# Patient Record
Sex: FEMALE | Race: WHITE | Employment: FULL TIME | URBAN - METROPOLITAN AREA
[De-identification: names, ages, dates, MRNs, and addresses within clinical notes are randomized per-mention and may not be internally consistent; named-entity substitution may affect disease eponyms.]

---

## 2024-01-02 ENCOUNTER — APPOINTMENT (OUTPATIENT)
Dept: GENERAL RADIOLOGY | Age: 59
End: 2024-01-02

## 2024-01-02 ENCOUNTER — HOSPITAL ENCOUNTER (EMERGENCY)
Age: 59
Discharge: HOME | End: 2024-01-02
Attending: EMERGENCY MEDICINE

## 2024-01-02 VITALS
HEIGHT: 67 IN | HEART RATE: 59 BPM | SYSTOLIC BLOOD PRESSURE: 129 MMHG | WEIGHT: 170 LBS | RESPIRATION RATE: 16 BRPM | DIASTOLIC BLOOD PRESSURE: 71 MMHG | OXYGEN SATURATION: 96 % | BODY MASS INDEX: 26.68 KG/M2

## 2024-01-02 DIAGNOSIS — S93.402A SPRAIN OF LEFT ANKLE, UNSPECIFIED LIGAMENT, INITIAL ENCOUNTER: Primary | ICD-10-CM

## 2024-01-02 PROCEDURE — 73630 X-RAY EXAM OF FOOT: CPT

## 2024-01-02 PROCEDURE — 99284 EMERGENCY DEPT VISIT MOD MDM: CPT

## 2024-01-02 PROCEDURE — 6360000002 HC RX W HCPCS: Performed by: NURSE PRACTITIONER

## 2024-01-02 PROCEDURE — 73610 X-RAY EXAM OF ANKLE: CPT

## 2024-01-02 PROCEDURE — 96372 THER/PROPH/DIAG INJ SC/IM: CPT

## 2024-01-02 RX ORDER — ACETAMINOPHEN AND CODEINE PHOSPHATE 300; 30 MG/1; MG/1
1 TABLET ORAL EVERY 6 HOURS PRN
Qty: 10 TABLET | Refills: 0 | Status: SHIPPED | OUTPATIENT
Start: 2024-01-02 | End: 2024-01-05

## 2024-01-02 RX ORDER — KETOROLAC TROMETHAMINE 30 MG/ML
30 INJECTION, SOLUTION INTRAMUSCULAR; INTRAVENOUS ONCE
Status: COMPLETED | OUTPATIENT
Start: 2024-01-02 | End: 2024-01-02

## 2024-01-02 RX ADMIN — KETOROLAC TROMETHAMINE 30 MG: 30 INJECTION, SOLUTION INTRAMUSCULAR; INTRAVENOUS at 14:11

## 2024-01-02 ASSESSMENT — PAIN - FUNCTIONAL ASSESSMENT: PAIN_FUNCTIONAL_ASSESSMENT: 0-10

## 2024-01-02 ASSESSMENT — PAIN SCALES - GENERAL
PAINLEVEL_OUTOF10: 7
PAINLEVEL_OUTOF10: 7

## 2024-01-02 ASSESSMENT — PAIN DESCRIPTION - ORIENTATION: ORIENTATION: LEFT

## 2024-01-02 ASSESSMENT — ENCOUNTER SYMPTOMS: COLOR CHANGE: 1

## 2024-01-02 ASSESSMENT — PAIN DESCRIPTION - LOCATION: LOCATION: ANKLE

## 2024-01-02 NOTE — DISCHARGE INSTRUCTIONS
Wear the orthopedic boot for the next week.  Keep your ankle elevated while at rest.  Take medication as prescribed.  Tylenol with codeine may cause drowsiness.  Follow-up with the podiatrist in 1 week.  Return to emergency department for worsening or new symptoms.

## 2024-01-02 NOTE — ED PROVIDER NOTES
eMERGENCY dEPARTMENT eNCOUnter   Independent Attestation     Pt Name: Reta Flores  MRN: [id number]  Birthdate 1965  Date of evaluation: 1/2/24     Reta Flores is a 58 y.o. female with CC: Ankle Pain (TRIPPED AND INJURE LEFT ANKLE)        This visit was performed by both a physician and an APC. I performed all aspects of the MDM as documented.      Erica B Goldberger, MD  Attending Emergency Physician           Goldberger, Erica B, MD  01/02/24 1928    
interpreted by the emergency physician with the below findings:    Interpretation per the Radiologist below, if available at the time of this note:    XR FOOT LEFT (MIN 3 VIEWS)    Result Date: 1/2/2024  EXAMINATION: THREE XRAY VIEWS OF THE LEFT FOOT 1/2/2024 2:10 pm COMPARISON: None. HISTORY: ORDERING SYSTEM PROVIDED HISTORY: INJURY TECHNOLOGIST PROVIDED HISTORY: INJURY Reason for Exam: Pt c/o pain to left foot/ankle, injury FINDINGS: There is no evidence of acute fracture.  There is normal alignment of the tarsometatarsal joints.  No acute joint abnormality.  No focal osseous lesion. No focal soft tissue abnormality.  Incidental note is made of spur formation at the insertion of the plantar fascia into the calcaneus peer     No acute osseous abnormality.     XR ANKLE LEFT (MIN 3 VIEWS)    Result Date: 1/2/2024  EXAMINATION: THREE XRAY VIEWS OF THE LEFT ANKLE 1/2/2024 2:03 pm COMPARISON: None. HISTORY: ORDERING SYSTEM PROVIDED HISTORY: INJURY TECHNOLOGIST PROVIDED HISTORY: INJURY Reason for Exam: Pt c/o pain to left foot/ankle, injury FINDINGS: No evidence of acute fracture or dislocation.  Normal alignment of the ankle mortise.  No focal osseous lesion.  No evidence of joint effusion. No focal soft tissue abnormality.  Incidental note is made of spur formation at the insertion of the plantar fascia into the calcaneus.     No acute abnormality of the ankle.         LABS:  Labs Reviewed - No data to display    All other labs were within normal range or not returned as of this dictation.    EMERGENCY DEPARTMENT COURSE and DIFFERENTIAL DIAGNOSIS/MDM:   Vitals:    Vitals:    01/02/24 1338 01/02/24 1416   BP: 129/71    Pulse: 59    Resp: 16    SpO2: (!) 2% 96%   Weight: 77.1 kg (170 lb)    Height: 1.702 m (5' 7\")          MEDICATIONS GIVEN IN THE ED:  Medications   ketorolac (TORADOL) injection 30 mg (30 mg IntraMUSCular Given 1/2/24 1411)       CLINICAL DECISION MAKING:  The patient presented alert with a nontoxic

## 2024-01-02 NOTE — ED NOTES
Pt to er with c/o left ankle pain. Pt states she tripped and fell. Pt has swelling noted to left ankle. No obvious deformity noted. Pt a&ox3. Skin warm and dry. Respirations even and non-labored.

## 2024-03-23 ENCOUNTER — HOSPITAL ENCOUNTER (EMERGENCY)
Age: 59
Discharge: HOME | End: 2024-03-24
Attending: STUDENT IN AN ORGANIZED HEALTH CARE EDUCATION/TRAINING PROGRAM
Payer: COMMERCIAL

## 2024-03-23 VITALS
BODY MASS INDEX: 26.68 KG/M2 | DIASTOLIC BLOOD PRESSURE: 89 MMHG | OXYGEN SATURATION: 97 % | TEMPERATURE: 97.5 F | SYSTOLIC BLOOD PRESSURE: 149 MMHG | HEART RATE: 61 BPM | RESPIRATION RATE: 14 BRPM | HEIGHT: 67 IN | WEIGHT: 170 LBS

## 2024-03-23 DIAGNOSIS — K29.00 ACUTE GASTRITIS, PRESENCE OF BLEEDING UNSPECIFIED, UNSPECIFIED GASTRITIS TYPE: Primary | ICD-10-CM

## 2024-03-23 DIAGNOSIS — Z98.84 HISTORY OF ROUX-EN-Y GASTRIC BYPASS: ICD-10-CM

## 2024-03-23 PROCEDURE — 99285 EMERGENCY DEPT VISIT HI MDM: CPT

## 2024-03-23 PROCEDURE — 96375 TX/PRO/DX INJ NEW DRUG ADDON: CPT

## 2024-03-23 PROCEDURE — 96374 THER/PROPH/DIAG INJ IV PUSH: CPT

## 2024-03-23 PROCEDURE — 96376 TX/PRO/DX INJ SAME DRUG ADON: CPT

## 2024-03-23 RX ORDER — FLUTICASONE PROPIONATE 50 MCG
SPRAY, SUSPENSION (ML) NASAL
COMMUNITY
Start: 2016-10-30

## 2024-03-23 RX ORDER — LURASIDONE HYDROCHLORIDE 40 MG/1
TABLET, FILM COATED ORAL
COMMUNITY
Start: 2021-12-02

## 2024-03-23 RX ORDER — CLONAZEPAM 1 MG/1
1 TABLET ORAL DAILY
COMMUNITY
Start: 2024-03-04

## 2024-03-23 RX ORDER — ATORVASTATIN CALCIUM 10 MG/1
TABLET, FILM COATED ORAL
COMMUNITY

## 2024-03-23 RX ORDER — EPINEPHRINE 0.3 MG/.3ML
INJECTION SUBCUTANEOUS
COMMUNITY
Start: 2024-02-16

## 2024-03-23 RX ORDER — LAMOTRIGINE 100 MG/1
100 TABLET ORAL DAILY
COMMUNITY

## 2024-03-23 RX ORDER — DERMATOPHAGOIDES PTERONYSSINUS AND DERMATOPHAGOIDES FARINAE 6; 6 [ARB'U]/1; [ARB'U]/1
TABLET SUBLINGUAL
COMMUNITY
Start: 2024-03-11

## 2024-03-23 ASSESSMENT — PAIN - FUNCTIONAL ASSESSMENT: PAIN_FUNCTIONAL_ASSESSMENT: 0-10

## 2024-03-23 ASSESSMENT — PAIN SCALES - GENERAL: PAINLEVEL_OUTOF10: 9

## 2024-03-24 ENCOUNTER — APPOINTMENT (OUTPATIENT)
Dept: CT IMAGING | Age: 59
End: 2024-03-24
Payer: COMMERCIAL

## 2024-03-24 LAB
ALBUMIN SERPL-MCNC: 4.2 G/DL (ref 3.5–5.2)
ALP SERPL-CCNC: 80 U/L (ref 35–104)
ALT SERPL-CCNC: 31 U/L (ref 5–33)
ANION GAP SERPL CALCULATED.3IONS-SCNC: 9 MMOL/L (ref 9–17)
AST SERPL-CCNC: 30 U/L
BASOPHILS # BLD: 0.04 K/UL (ref 0–0.2)
BASOPHILS NFR BLD: 1 % (ref 0–2)
BILIRUB SERPL-MCNC: 0.4 MG/DL (ref 0.3–1.2)
BILIRUB UR QL STRIP: NEGATIVE
BUN SERPL-MCNC: 18 MG/DL (ref 6–20)
BUN/CREAT SERPL: 23 (ref 9–20)
CALCIUM SERPL-MCNC: 9.6 MG/DL (ref 8.6–10.4)
CHLORIDE SERPL-SCNC: 104 MMOL/L (ref 98–107)
CLARITY UR: CLEAR
CO2 SERPL-SCNC: 29 MMOL/L (ref 20–31)
COLOR UR: YELLOW
CREAT SERPL-MCNC: 0.8 MG/DL (ref 0.5–0.9)
D DIMER PPP FEU-MCNC: 0.4 UG/ML FEU (ref 0–0.59)
EOSINOPHIL # BLD: 0.19 K/UL (ref 0–0.44)
EOSINOPHILS RELATIVE PERCENT: 4 % (ref 1–4)
ERYTHROCYTE [DISTWIDTH] IN BLOOD BY AUTOMATED COUNT: 12.2 % (ref 11.8–14.4)
GFR SERPL CREATININE-BSD FRML MDRD: >60 ML/MIN/1.73M2
GLUCOSE SERPL-MCNC: 98 MG/DL (ref 70–99)
GLUCOSE UR STRIP-MCNC: NEGATIVE MG/DL
HCT VFR BLD AUTO: 35.8 % (ref 36.3–47.1)
HGB BLD-MCNC: 12 G/DL (ref 11.9–15.1)
HGB UR QL STRIP.AUTO: NEGATIVE
IMM GRANULOCYTES # BLD AUTO: 0.01 K/UL (ref 0–0.3)
IMM GRANULOCYTES NFR BLD: 0 %
KETONES UR STRIP-MCNC: NEGATIVE MG/DL
LACTATE BLDV-SCNC: 1.6 MMOL/L (ref 0.5–1.9)
LEUKOCYTE ESTERASE UR QL STRIP: NEGATIVE
LIPASE SERPL-CCNC: 72 U/L (ref 13–60)
LYMPHOCYTES NFR BLD: 1.68 K/UL (ref 1.1–3.7)
LYMPHOCYTES RELATIVE PERCENT: 37 % (ref 24–43)
MCH RBC QN AUTO: 31.3 PG (ref 25.2–33.5)
MCHC RBC AUTO-ENTMCNC: 33.5 G/DL (ref 28.4–34.8)
MCV RBC AUTO: 93.5 FL (ref 82.6–102.9)
MONOCYTES NFR BLD: 0.28 K/UL (ref 0.1–1.2)
MONOCYTES NFR BLD: 6 % (ref 3–12)
NEUTROPHILS NFR BLD: 52 % (ref 36–65)
NEUTS SEG NFR BLD: 2.4 K/UL (ref 1.5–8.1)
NITRITE UR QL STRIP: NEGATIVE
NRBC BLD-RTO: 0 PER 100 WBC
PH UR STRIP: 6 [PH] (ref 5–8)
PLATELET # BLD AUTO: 158 K/UL (ref 138–453)
PMV BLD AUTO: 9.4 FL (ref 8.1–13.5)
POTASSIUM SERPL-SCNC: 3.7 MMOL/L (ref 3.7–5.3)
PROT SERPL-MCNC: 6.1 G/DL (ref 6.4–8.3)
PROT UR STRIP-MCNC: NEGATIVE MG/DL
RBC # BLD AUTO: 3.83 M/UL (ref 3.95–5.11)
SODIUM SERPL-SCNC: 142 MMOL/L (ref 135–144)
SP GR UR STRIP: 1.01 (ref 1–1.03)
TROPONIN I SERPL HS-MCNC: <6 NG/L (ref 0–14)
UROBILINOGEN UR STRIP-ACNC: NORMAL EU/DL (ref 0–1)
WBC OTHER # BLD: 4.6 K/UL (ref 3.5–11.3)

## 2024-03-24 PROCEDURE — 85025 COMPLETE CBC W/AUTO DIFF WBC: CPT

## 2024-03-24 PROCEDURE — 96376 TX/PRO/DX INJ SAME DRUG ADON: CPT

## 2024-03-24 PROCEDURE — 6360000002 HC RX W HCPCS: Performed by: STUDENT IN AN ORGANIZED HEALTH CARE EDUCATION/TRAINING PROGRAM

## 2024-03-24 PROCEDURE — 6370000000 HC RX 637 (ALT 250 FOR IP): Performed by: STUDENT IN AN ORGANIZED HEALTH CARE EDUCATION/TRAINING PROGRAM

## 2024-03-24 PROCEDURE — 96375 TX/PRO/DX INJ NEW DRUG ADDON: CPT

## 2024-03-24 PROCEDURE — 81003 URINALYSIS AUTO W/O SCOPE: CPT

## 2024-03-24 PROCEDURE — 85379 FIBRIN DEGRADATION QUANT: CPT

## 2024-03-24 PROCEDURE — 36415 COLL VENOUS BLD VENIPUNCTURE: CPT

## 2024-03-24 PROCEDURE — 83605 ASSAY OF LACTIC ACID: CPT

## 2024-03-24 PROCEDURE — 80053 COMPREHEN METABOLIC PANEL: CPT

## 2024-03-24 PROCEDURE — 84484 ASSAY OF TROPONIN QUANT: CPT

## 2024-03-24 PROCEDURE — 83690 ASSAY OF LIPASE: CPT

## 2024-03-24 PROCEDURE — 96374 THER/PROPH/DIAG INJ IV PUSH: CPT

## 2024-03-24 PROCEDURE — 6360000004 HC RX CONTRAST MEDICATION: Performed by: STUDENT IN AN ORGANIZED HEALTH CARE EDUCATION/TRAINING PROGRAM

## 2024-03-24 PROCEDURE — 2580000003 HC RX 258: Performed by: STUDENT IN AN ORGANIZED HEALTH CARE EDUCATION/TRAINING PROGRAM

## 2024-03-24 PROCEDURE — 74177 CT ABD & PELVIS W/CONTRAST: CPT

## 2024-03-24 RX ORDER — KETOROLAC TROMETHAMINE 30 MG/ML
30 INJECTION, SOLUTION INTRAMUSCULAR; INTRAVENOUS ONCE
Status: COMPLETED | OUTPATIENT
Start: 2024-03-24 | End: 2024-03-24

## 2024-03-24 RX ORDER — 0.9 % SODIUM CHLORIDE 0.9 %
80 INTRAVENOUS SOLUTION INTRAVENOUS ONCE
Status: DISCONTINUED | OUTPATIENT
Start: 2024-03-24 | End: 2024-03-24 | Stop reason: HOSPADM

## 2024-03-24 RX ORDER — MAGNESIUM HYDROXIDE/ALUMINUM HYDROXICE/SIMETHICONE 120; 1200; 1200 MG/30ML; MG/30ML; MG/30ML
30 SUSPENSION ORAL ONCE
Status: COMPLETED | OUTPATIENT
Start: 2024-03-24 | End: 2024-03-24

## 2024-03-24 RX ORDER — PANTOPRAZOLE SODIUM 20 MG/1
40 TABLET, DELAYED RELEASE ORAL DAILY
Qty: 60 TABLET | Refills: 0 | Status: SHIPPED | OUTPATIENT
Start: 2024-03-24

## 2024-03-24 RX ORDER — ONDANSETRON 2 MG/ML
4 INJECTION INTRAMUSCULAR; INTRAVENOUS ONCE
Status: COMPLETED | OUTPATIENT
Start: 2024-03-24 | End: 2024-03-24

## 2024-03-24 RX ORDER — SODIUM CHLORIDE 0.9 % (FLUSH) 0.9 %
10 SYRINGE (ML) INJECTION ONCE
Status: COMPLETED | OUTPATIENT
Start: 2024-03-24 | End: 2024-03-24

## 2024-03-24 RX ORDER — MORPHINE SULFATE 4 MG/ML
4 INJECTION, SOLUTION INTRAMUSCULAR; INTRAVENOUS ONCE
Status: COMPLETED | OUTPATIENT
Start: 2024-03-24 | End: 2024-03-24

## 2024-03-24 RX ORDER — PANTOPRAZOLE SODIUM 40 MG/1
40 TABLET, DELAYED RELEASE ORAL ONCE
Status: COMPLETED | OUTPATIENT
Start: 2024-03-24 | End: 2024-03-24

## 2024-03-24 RX ADMIN — ALUMINUM HYDROXIDE, MAGNESIUM HYDROXIDE, AND SIMETHICONE 30 ML: 1200; 120; 1200 SUSPENSION ORAL at 03:59

## 2024-03-24 RX ADMIN — ONDANSETRON 4 MG: 2 INJECTION INTRAMUSCULAR; INTRAVENOUS at 00:57

## 2024-03-24 RX ADMIN — PANTOPRAZOLE SODIUM 40 MG: 40 TABLET, DELAYED RELEASE ORAL at 03:59

## 2024-03-24 RX ADMIN — SODIUM CHLORIDE, PRESERVATIVE FREE 10 ML: 5 INJECTION INTRAVENOUS at 01:53

## 2024-03-24 RX ADMIN — KETOROLAC TROMETHAMINE 30 MG: 30 INJECTION, SOLUTION INTRAMUSCULAR at 02:03

## 2024-03-24 RX ADMIN — HYDROMORPHONE HYDROCHLORIDE 0.5 MG: 1 INJECTION, SOLUTION INTRAMUSCULAR; INTRAVENOUS; SUBCUTANEOUS at 03:27

## 2024-03-24 RX ADMIN — Medication 80 ML: at 01:53

## 2024-03-24 RX ADMIN — IOPAMIDOL 75 ML: 755 INJECTION, SOLUTION INTRAVENOUS at 01:43

## 2024-03-24 RX ADMIN — MORPHINE SULFATE 4 MG: 4 INJECTION, SOLUTION INTRAMUSCULAR; INTRAVENOUS at 00:57

## 2024-03-24 RX ADMIN — ONDANSETRON 4 MG: 2 INJECTION INTRAMUSCULAR; INTRAVENOUS at 03:27

## 2024-03-24 ASSESSMENT — PAIN SCALES - GENERAL
PAINLEVEL_OUTOF10: 9
PAINLEVEL_OUTOF10: 8
PAINLEVEL_OUTOF10: 7

## 2024-03-24 ASSESSMENT — PAIN DESCRIPTION - LOCATION
LOCATION: ABDOMEN;BACK
LOCATION: ABDOMEN;BACK

## 2024-03-24 ASSESSMENT — PAIN DESCRIPTION - ORIENTATION
ORIENTATION: MID;RIGHT
ORIENTATION: MID

## 2024-03-24 ASSESSMENT — PAIN DESCRIPTION - DESCRIPTORS
DESCRIPTORS: SQUEEZING;STABBING;ACHING
DESCRIPTORS: STABBING

## 2024-03-24 NOTE — DISCHARGE INSTRUCTIONS
Call today or tomorrow to follow up with Dr. Erickson for endoscopy.    Take your medication as indicated.  Do not drink any alcohol.  Avoid spicy foods and dairy products.  Avoid drinking carbonated beverages (especially any of the dark beverages like coke or pepsi).    Return to the Emergency Department for worsening of symptoms, excessive nausea or vomiting, throwing up blood, black stools, any other care or concern.

## 2024-03-24 NOTE — ED NOTES
Pt arrived to ED with complaints of ABD pain that radiates to her back. She states it has been on going for a while (approx 2months) but has woken her up from her sleep the past few nights. A&O x4. RR even and unlabored.

## 2024-03-25 NOTE — ED PROVIDER NOTES
[facility] EMERGENCY DEPARTMENT ENCOUNTER      Pt Name: Reta Flores  MRN: [id number]  Birthdate 1965  Date of evaluation: 3/25/24    CHIEF COMPLAINT       Chief Complaint   Patient presents with    Abdominal Pain     Middle, upper abdomen and radiates to her back       HISTORY OF PRESENT ILLNESS   Reta Flores is a 59 y.o. female who presents with nausea, vomiting, abdominal pain.  Symptoms have been constant and worsening ongoing for the past 2 months.  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  Pain has been intermittent.  Worsening.    PASTMEDICAL HISTORY     Past Medical History:   Diagnosis Date    Diabetes mellitus (HCC)      Past Problem List  There is no problem list on file for this patient.      SURGICAL HISTORY       Past Surgical History:   Procedure Laterality Date    BREAST SURGERY      GASTRIC BYPASS SURGERY      OVARIAN CYST REMOVAL      RHINOPLASTY      TYMPANOPLASTY         CURRENT MEDICATIONS       Discharge Medication List as of 3/24/2024  3:58 AM        CONTINUE these medications which have NOT CHANGED    Details   LATUDA 40 MG TABS tablet DAWHistorical Med      house dust mite allergen extract (ODACTRA) 12 SQ-HDM SUBL sublingual tablet PLEASE SEE ATTACHED FOR DETAILED DIRECTIONSHistorical Med      clonazePAM (KLONOPIN) 1 MG tablet Take 1 tablet by mouth daily. Max Daily Amount: 1 mgHistorical Med      EPINEPHrine (EPIPEN) 0.3 MG/0.3ML SOAJ injection AS DIRECTED INJECTION ONCE FOR SEVERE ALLERGIC REACTION 30 DAYSHistorical Med      fluticasone (FLONASE) 50 MCG/ACT nasal spray Historical Med      atorvastatin (LIPITOR) 10 MG tablet TAKE 1 TABLET ONCE DAILY ORALLY ONCE A DAY 90 DAYSHistorical Med      lamoTRIgine (LAMICTAL) 100 MG tablet Take 1 tablet by mouth dailyHistorical Med      PARoxetine (PAXIL) 40 MG tablet Take 40 mg by mouth every morning.      ALPRAZolam (XANAX) 0.25 MG tablet Take 0.5 mg by mouth nightly as needed for Sleep.      ondansetron (ZOFRAN ODT)

## 2024-04-02 RX ORDER — PANTOPRAZOLE SODIUM 20 MG/1
40 TABLET, DELAYED RELEASE ORAL DAILY
Qty: 60 TABLET | Refills: 0 | OUTPATIENT
Start: 2024-04-02

## 2024-04-04 ENCOUNTER — INITIAL CONSULT (OUTPATIENT)
Dept: BARIATRICS/WEIGHT MGMT | Age: 59
End: 2024-04-04
Payer: COMMERCIAL

## 2024-04-04 VITALS
DIASTOLIC BLOOD PRESSURE: 70 MMHG | HEART RATE: 60 BPM | SYSTOLIC BLOOD PRESSURE: 120 MMHG | WEIGHT: 176 LBS | HEIGHT: 66 IN | BODY MASS INDEX: 28.28 KG/M2

## 2024-04-04 DIAGNOSIS — K90.89 OTHER SPECIFIED INTESTINAL MALABSORPTION: ICD-10-CM

## 2024-04-04 DIAGNOSIS — Z98.84 S/P GASTRIC BYPASS: ICD-10-CM

## 2024-04-04 DIAGNOSIS — K28.9 GASTROJEJUNAL ULCER: Primary | ICD-10-CM

## 2024-04-04 PROCEDURE — 99204 OFFICE O/P NEW MOD 45 MIN: CPT | Performed by: SURGERY

## 2024-04-04 RX ORDER — SUCRALFATE 1 G/1
1 TABLET ORAL 4 TIMES DAILY
COMMUNITY
Start: 2024-03-25 | End: 2024-04-12 | Stop reason: ALTCHOICE

## 2024-04-04 RX ORDER — AMMONIUM LACTATE 12 G/100G
1 LOTION TOPICAL PRN
COMMUNITY
Start: 2024-02-26

## 2024-04-04 RX ORDER — PANTOPRAZOLE SODIUM 40 MG/1
40 TABLET, DELAYED RELEASE ORAL 2 TIMES DAILY
Qty: 30 TABLET | Refills: 3 | Status: SHIPPED | OUTPATIENT
Start: 2024-04-04

## 2024-04-04 RX ORDER — AZELASTINE HYDROCHLORIDE 137 UG/1
1 SPRAY, METERED NASAL DAILY
COMMUNITY
Start: 2024-02-07

## 2024-04-04 RX ORDER — SUCRALFATE 1 G/1
1 TABLET ORAL 4 TIMES DAILY
Qty: 120 TABLET | Refills: 3 | Status: SHIPPED | OUTPATIENT
Start: 2024-04-04

## 2024-04-04 RX ORDER — METOPROLOL SUCCINATE 25 MG/1
12.5 TABLET, EXTENDED RELEASE ORAL DAILY
COMMUNITY
Start: 2022-08-13

## 2024-04-04 RX ORDER — ALBUTEROL SULFATE 90 UG/1
2 AEROSOL, METERED RESPIRATORY (INHALATION) EVERY 6 HOURS PRN
COMMUNITY
Start: 2024-02-07

## 2024-04-04 NOTE — PROGRESS NOTES
0    dicyclomine (BENTYL) 10 MG capsule Take 1 capsule by mouth every 6 hours as needed (cramps). 20 capsule 0    EPINEPHrine (EPIPEN) 0.3 MG/0.3ML SOAJ injection AS DIRECTED INJECTION ONCE FOR SEVERE ALLERGIC REACTION 30 DAYS (Patient not taking: Reported on 4/4/2024)       No current facility-administered medications for this visit.          SOCIAL:      This patient is alone for the evaluation today.      [] HIV Risk Factors (i.e.) intravenous drug abuser; at risk sexual behavior; received blood products    [] TB Risk Factors (i.e.) Medically underserved, institutional care, foreign born, endemic area; exposure to active case    [] Hepatitis B&C Risk Factors (i.e.) Received blood transfusion prior to 1992; recreational drug use; high risk sexual behaviors; tattoos or body piercings; contact with blood or needle sticks in the workplace    Comprehension    Ability to grasp concepts and respond to questions:   [x] High   [] Medium   [] Low    Motivation    [x] Asks Questions; eager to learn   [] Needs education   [] Extreme anxiety    [] uncooperative   [] Denies need for education    English Speaking Ability    [x] Speaks English well   [x] Reads English well   [x] Understands spoken english    [x] Understands written English   [] No need for interpretive support      [] Might benefit from interpretive support   []  required for all services     REVIEW OF SYSTEMS: (Negative unless marked otherwise)     See review of Systems scanned into media    PRESENT ILLNESS:     Weight Parameters  Weight 79.8 kg (176 lb)   Height 1.676 m (5' 6\")   BMI Body mass index is 28.41 kg/m².   IBW     EBW               IMMUNIZATION STATUS  Immunization History   Administered Date(s) Administered    COVID-19, PFIZER PURPLE top, DILUTE for use, (age 12 y+), 30mcg/0.3mL 03/17/2021, 04/07/2021, 12/18/2021       FALLS ASSESSMENT    [x] LOW RISK FOR FALLS    [] MODERATE RISK FOR FALLS    [] Difficulty walking/selfcare    [] Falls

## 2024-04-07 VITALS
RESPIRATION RATE: 16 BRPM | SYSTOLIC BLOOD PRESSURE: 107 MMHG | OXYGEN SATURATION: 97 % | TEMPERATURE: 97.7 F | DIASTOLIC BLOOD PRESSURE: 66 MMHG | HEART RATE: 56 BPM

## 2024-04-07 PROCEDURE — 96375 TX/PRO/DX INJ NEW DRUG ADDON: CPT

## 2024-04-07 PROCEDURE — 99285 EMERGENCY DEPT VISIT HI MDM: CPT

## 2024-04-07 PROCEDURE — 96374 THER/PROPH/DIAG INJ IV PUSH: CPT

## 2024-04-07 ASSESSMENT — PAIN - FUNCTIONAL ASSESSMENT: PAIN_FUNCTIONAL_ASSESSMENT: 0-10

## 2024-04-07 ASSESSMENT — PAIN SCALES - GENERAL: PAINLEVEL_OUTOF10: 9

## 2024-04-08 ENCOUNTER — HOSPITAL ENCOUNTER (EMERGENCY)
Age: 59
Discharge: HOME | End: 2024-04-08
Attending: STUDENT IN AN ORGANIZED HEALTH CARE EDUCATION/TRAINING PROGRAM
Payer: COMMERCIAL

## 2024-04-08 ENCOUNTER — APPOINTMENT (OUTPATIENT)
Dept: CT IMAGING | Age: 59
End: 2024-04-08
Payer: COMMERCIAL

## 2024-04-08 DIAGNOSIS — R10.13 ABDOMINAL PAIN, EPIGASTRIC: Primary | ICD-10-CM

## 2024-04-08 LAB
ALBUMIN SERPL-MCNC: 3.7 G/DL (ref 3.5–5.2)
ALP SERPL-CCNC: 77 U/L (ref 35–104)
ALT SERPL-CCNC: 28 U/L (ref 5–33)
ANION GAP SERPL CALCULATED.3IONS-SCNC: 7 MMOL/L (ref 9–17)
AST SERPL-CCNC: 20 U/L
BASOPHILS # BLD: 0.06 K/UL (ref 0–0.2)
BASOPHILS NFR BLD: 1 % (ref 0–2)
BILIRUB SERPL-MCNC: 0.4 MG/DL (ref 0.3–1.2)
BUN SERPL-MCNC: 9 MG/DL (ref 6–20)
BUN/CREAT SERPL: 11 (ref 9–20)
CALCIUM SERPL-MCNC: 8.7 MG/DL (ref 8.6–10.4)
CHLORIDE SERPL-SCNC: 99 MMOL/L (ref 98–107)
CO2 SERPL-SCNC: 29 MMOL/L (ref 20–31)
CREAT SERPL-MCNC: 0.8 MG/DL (ref 0.5–0.9)
EOSINOPHIL # BLD: 0.3 K/UL (ref 0–0.44)
EOSINOPHILS RELATIVE PERCENT: 6 % (ref 1–4)
ERYTHROCYTE [DISTWIDTH] IN BLOOD BY AUTOMATED COUNT: 12.1 % (ref 11.8–14.4)
GFR SERPL CREATININE-BSD FRML MDRD: 85 ML/MIN/1.73M2
GLUCOSE SERPL-MCNC: 105 MG/DL (ref 70–99)
HCT VFR BLD AUTO: 36.5 % (ref 36.3–47.1)
HGB BLD-MCNC: 12.4 G/DL (ref 11.9–15.1)
IMM GRANULOCYTES # BLD AUTO: 0.02 K/UL (ref 0–0.3)
IMM GRANULOCYTES NFR BLD: 0 %
LACTATE BLDV-SCNC: 1.1 MMOL/L (ref 0.5–1.9)
LIPASE SERPL-CCNC: 117 U/L (ref 13–60)
LYMPHOCYTES NFR BLD: 2.38 K/UL (ref 1.1–3.7)
LYMPHOCYTES RELATIVE PERCENT: 51 % (ref 24–43)
MCH RBC QN AUTO: 31.4 PG (ref 25.2–33.5)
MCHC RBC AUTO-ENTMCNC: 34 G/DL (ref 28.4–34.8)
MCV RBC AUTO: 92.4 FL (ref 82.6–102.9)
MONOCYTES NFR BLD: 0.37 K/UL (ref 0.1–1.2)
MONOCYTES NFR BLD: 8 % (ref 3–12)
NEUTROPHILS NFR BLD: 34 % (ref 36–65)
NEUTS SEG NFR BLD: 1.62 K/UL (ref 1.5–8.1)
NRBC BLD-RTO: 0 PER 100 WBC
PLATELET # BLD AUTO: 178 K/UL (ref 138–453)
PMV BLD AUTO: 9.1 FL (ref 8.1–13.5)
POTASSIUM SERPL-SCNC: 3.5 MMOL/L (ref 3.7–5.3)
PROT SERPL-MCNC: 5.6 G/DL (ref 6.4–8.3)
RBC # BLD AUTO: 3.95 M/UL (ref 3.95–5.11)
SODIUM SERPL-SCNC: 135 MMOL/L (ref 135–144)
TROPONIN I SERPL HS-MCNC: <6 NG/L (ref 0–14)
WBC OTHER # BLD: 4.8 K/UL (ref 3.5–11.3)

## 2024-04-08 PROCEDURE — 83690 ASSAY OF LIPASE: CPT

## 2024-04-08 PROCEDURE — C9113 INJ PANTOPRAZOLE SODIUM, VIA: HCPCS | Performed by: STUDENT IN AN ORGANIZED HEALTH CARE EDUCATION/TRAINING PROGRAM

## 2024-04-08 PROCEDURE — 85025 COMPLETE CBC W/AUTO DIFF WBC: CPT

## 2024-04-08 PROCEDURE — 6360000004 HC RX CONTRAST MEDICATION: Performed by: STUDENT IN AN ORGANIZED HEALTH CARE EDUCATION/TRAINING PROGRAM

## 2024-04-08 PROCEDURE — 2580000003 HC RX 258: Performed by: STUDENT IN AN ORGANIZED HEALTH CARE EDUCATION/TRAINING PROGRAM

## 2024-04-08 PROCEDURE — 74177 CT ABD & PELVIS W/CONTRAST: CPT

## 2024-04-08 PROCEDURE — 83605 ASSAY OF LACTIC ACID: CPT

## 2024-04-08 PROCEDURE — 96375 TX/PRO/DX INJ NEW DRUG ADDON: CPT

## 2024-04-08 PROCEDURE — 6370000000 HC RX 637 (ALT 250 FOR IP): Performed by: STUDENT IN AN ORGANIZED HEALTH CARE EDUCATION/TRAINING PROGRAM

## 2024-04-08 PROCEDURE — 84484 ASSAY OF TROPONIN QUANT: CPT

## 2024-04-08 PROCEDURE — 6360000002 HC RX W HCPCS: Performed by: STUDENT IN AN ORGANIZED HEALTH CARE EDUCATION/TRAINING PROGRAM

## 2024-04-08 PROCEDURE — A4216 STERILE WATER/SALINE, 10 ML: HCPCS | Performed by: STUDENT IN AN ORGANIZED HEALTH CARE EDUCATION/TRAINING PROGRAM

## 2024-04-08 PROCEDURE — 80053 COMPREHEN METABOLIC PANEL: CPT

## 2024-04-08 PROCEDURE — 96374 THER/PROPH/DIAG INJ IV PUSH: CPT

## 2024-04-08 RX ORDER — SODIUM CHLORIDE 0.9 % (FLUSH) 0.9 %
10 SYRINGE (ML) INJECTION PRN
Status: DISCONTINUED | OUTPATIENT
Start: 2024-04-08 | End: 2024-04-08 | Stop reason: HOSPADM

## 2024-04-08 RX ORDER — MORPHINE SULFATE 4 MG/ML
4 INJECTION, SOLUTION INTRAMUSCULAR; INTRAVENOUS ONCE
Status: COMPLETED | OUTPATIENT
Start: 2024-04-08 | End: 2024-04-08

## 2024-04-08 RX ORDER — ONDANSETRON 2 MG/ML
4 INJECTION INTRAMUSCULAR; INTRAVENOUS ONCE
Status: COMPLETED | OUTPATIENT
Start: 2024-04-08 | End: 2024-04-08

## 2024-04-08 RX ORDER — 0.9 % SODIUM CHLORIDE 0.9 %
80 INTRAVENOUS SOLUTION INTRAVENOUS ONCE
Status: COMPLETED | OUTPATIENT
Start: 2024-04-08 | End: 2024-04-08

## 2024-04-08 RX ORDER — OXYCODONE HYDROCHLORIDE AND ACETAMINOPHEN 5; 325 MG/1; MG/1
1 TABLET ORAL ONCE
Status: COMPLETED | OUTPATIENT
Start: 2024-04-08 | End: 2024-04-08

## 2024-04-08 RX ORDER — OXYCODONE HYDROCHLORIDE AND ACETAMINOPHEN 5; 325 MG/1; MG/1
1 TABLET ORAL EVERY 6 HOURS PRN
Qty: 15 TABLET | Refills: 0 | Status: SHIPPED | OUTPATIENT
Start: 2024-04-08 | End: 2024-04-13

## 2024-04-08 RX ADMIN — SODIUM CHLORIDE, PRESERVATIVE FREE 10 ML: 5 INJECTION INTRAVENOUS at 02:21

## 2024-04-08 RX ADMIN — ONDANSETRON 4 MG: 2 INJECTION INTRAMUSCULAR; INTRAVENOUS at 01:44

## 2024-04-08 RX ADMIN — OXYCODONE HYDROCHLORIDE AND ACETAMINOPHEN 1 TABLET: 5; 325 TABLET ORAL at 03:47

## 2024-04-08 RX ADMIN — PANTOPRAZOLE SODIUM 40 MG: 40 INJECTION, POWDER, FOR SOLUTION INTRAVENOUS at 01:23

## 2024-04-08 RX ADMIN — IOPAMIDOL 75 ML: 755 INJECTION, SOLUTION INTRAVENOUS at 02:21

## 2024-04-08 RX ADMIN — SODIUM CHLORIDE 80 ML: 0.9 INJECTION, SOLUTION INTRAVENOUS at 02:21

## 2024-04-08 RX ADMIN — MORPHINE SULFATE 4 MG: 4 INJECTION, SOLUTION INTRAMUSCULAR; INTRAVENOUS at 01:44

## 2024-04-08 ASSESSMENT — PAIN SCALES - GENERAL
PAINLEVEL_OUTOF10: 4
PAINLEVEL_OUTOF10: 7

## 2024-04-08 NOTE — DISCHARGE INSTRUCTIONS
Follow-up with Dr. Erickson.    Take your medication as indicated.  Do not drink any alcohol.  Avoid spicy foods and dairy products.  Avoid drinking carbonated beverages (especially any of the dark beverages like coke or pepsi).    Return to the Emergency Department for worsening of symptoms, excessive nausea or vomiting, throwing up blood, black stools, any other care or concern.

## 2024-04-08 NOTE — ED NOTES
Pt arrived to ED with c/o abdominal pain and nausea. Pt states she was diagnosed with a stomach ulcer a week ago. Pt states the pain worsened and now radiated to her back. Pt states she took Zofran, which isn't helping anymore. Pt is A&Ox4. Pt ambulatory in room.

## 2024-04-10 NOTE — ED PROVIDER NOTES
[facility] EMERGENCY DEPARTMENT ENCOUNTER      Pt Name: Reta Flores  MRN: [id number]  Birthdate 1965  Date of evaluation: 4/10/24    CHIEF COMPLAINT       Chief Complaint   Patient presents with    Abdominal Pain     Ulcer RT side going into back    Nausea     All day took Zofran did not help       HISTORY OF PRESENT ILLNESS   Reta Flores is a 59 y.o. female who presents with nausea, vomiting, abdominal pain.  Symptoms have been constant and worsening ongoing for the past 2 weeks.  History of Denver-en-Y gastric bypass almost 20 years ago.  Was referred to Dr. Erickson and is scheduled for EGD in a week.  Denies any hematemesis, melena.  States she has been taking Protonix as prescribed.    PASTMEDICAL HISTORY     Past Medical History:   Diagnosis Date    Diabetes mellitus (HCC)      Past Problem List  There is no problem list on file for this patient.      SURGICAL HISTORY       Past Surgical History:   Procedure Laterality Date    BREAST SURGERY      GASTRIC BYPASS SURGERY      OVARIAN CYST REMOVAL      RHINOPLASTY      TYMPANOPLASTY         CURRENT MEDICATIONS       Discharge Medication List as of 4/8/2024  3:44 AM        CONTINUE these medications which have NOT CHANGED    Details   albuterol sulfate HFA (PROVENTIL;VENTOLIN;PROAIR) 108 (90 Base) MCG/ACT inhaler Inhale 2 puffs into the lungs every 6 hours as needed for Shortness of BreathHistorical Med      ammonium lactate (LAC-HYDRIN) 12 % lotion Apply 1 Bottle topically as needed for Dry Skin, Topical, PRN Starting Mon 2/26/2024, Historical Med      Azelastine HCl 137 MCG/SPRAY SOLN 1 spray by Nasal route dailyHistorical Med      metoprolol succinate (TOPROL XL) 25 MG extended release tablet Take 0.5 tablets by mouth dailyHistorical Med      !! sucralfate (CARAFATE) 1 GM tablet Take 1 tablet by mouth 4 times dailyHistorical Med      !! sucralfate (CARAFATE) 1 GM tablet Take 1 tablet by mouth 4 times daily, Disp-120 tablet, R-3Normal      !! pantoprazole

## 2024-04-11 DIAGNOSIS — K90.89 OTHER SPECIFIED INTESTINAL MALABSORPTION: ICD-10-CM

## 2024-04-15 ENCOUNTER — ANESTHESIA (OUTPATIENT)
Dept: OPERATING ROOM | Age: 59
End: 2024-04-15
Payer: COMMERCIAL

## 2024-04-15 ENCOUNTER — ANESTHESIA EVENT (OUTPATIENT)
Dept: OPERATING ROOM | Age: 59
End: 2024-04-15
Payer: COMMERCIAL

## 2024-04-15 ENCOUNTER — HOSPITAL ENCOUNTER (OUTPATIENT)
Age: 59
Setting detail: OUTPATIENT SURGERY
Discharge: HOME | End: 2024-04-15
Attending: SURGERY | Admitting: SURGERY
Payer: COMMERCIAL

## 2024-04-15 VITALS
DIASTOLIC BLOOD PRESSURE: 73 MMHG | WEIGHT: 176 LBS | RESPIRATION RATE: 12 BRPM | BODY MASS INDEX: 28.28 KG/M2 | OXYGEN SATURATION: 97 % | SYSTOLIC BLOOD PRESSURE: 115 MMHG | TEMPERATURE: 96.8 F | HEIGHT: 66 IN | HEART RATE: 61 BPM

## 2024-04-15 DIAGNOSIS — K28.9 GJU (GASTROJEJUNAL ULCER): ICD-10-CM

## 2024-04-15 LAB
EKG ATRIAL RATE: 47 BPM
EKG P AXIS: 40 DEGREES
EKG P-R INTERVAL: 160 MS
EKG Q-T INTERVAL: 452 MS
EKG QRS DURATION: 86 MS
EKG QTC CALCULATION (BAZETT): 400 MS
EKG R AXIS: 8 DEGREES
EKG T AXIS: 33 DEGREES
EKG VENTRICULAR RATE: 47 BPM

## 2024-04-15 PROCEDURE — 3609012400 HC EGD TRANSORAL BIOPSY SINGLE/MULTIPLE: Performed by: SURGERY

## 2024-04-15 PROCEDURE — 93010 ELECTROCARDIOGRAM REPORT: CPT | Performed by: INTERNAL MEDICINE

## 2024-04-15 PROCEDURE — 3700000000 HC ANESTHESIA ATTENDED CARE: Performed by: SURGERY

## 2024-04-15 PROCEDURE — 6360000002 HC RX W HCPCS: Performed by: NURSE ANESTHETIST, CERTIFIED REGISTERED

## 2024-04-15 PROCEDURE — 88305 TISSUE EXAM BY PATHOLOGIST: CPT

## 2024-04-15 PROCEDURE — 3700000001 HC ADD 15 MINUTES (ANESTHESIA): Performed by: SURGERY

## 2024-04-15 PROCEDURE — 2580000003 HC RX 258: Performed by: NURSE ANESTHETIST, CERTIFIED REGISTERED

## 2024-04-15 PROCEDURE — 2709999900 HC NON-CHARGEABLE SUPPLY: Performed by: SURGERY

## 2024-04-15 PROCEDURE — 7100000010 HC PHASE II RECOVERY - FIRST 15 MIN: Performed by: SURGERY

## 2024-04-15 PROCEDURE — 93005 ELECTROCARDIOGRAM TRACING: CPT | Performed by: STUDENT IN AN ORGANIZED HEALTH CARE EDUCATION/TRAINING PROGRAM

## 2024-04-15 PROCEDURE — 43239 EGD BIOPSY SINGLE/MULTIPLE: CPT | Performed by: SURGERY

## 2024-04-15 PROCEDURE — 7100000011 HC PHASE II RECOVERY - ADDTL 15 MIN: Performed by: SURGERY

## 2024-04-15 RX ORDER — MIDAZOLAM HYDROCHLORIDE 2 MG/2ML
1 INJECTION, SOLUTION INTRAMUSCULAR; INTRAVENOUS EVERY 10 MIN PRN
Status: DISCONTINUED | OUTPATIENT
Start: 2024-04-15 | End: 2024-04-15 | Stop reason: HOSPADM

## 2024-04-15 RX ORDER — ALBUTEROL SULFATE 2.5 MG/3ML
2.5 SOLUTION RESPIRATORY (INHALATION) EVERY 8 HOURS PRN
Status: DISCONTINUED | OUTPATIENT
Start: 2024-04-15 | End: 2024-04-15 | Stop reason: HOSPADM

## 2024-04-15 RX ORDER — SODIUM CHLORIDE, SODIUM LACTATE, POTASSIUM CHLORIDE, CALCIUM CHLORIDE 600; 310; 30; 20 MG/100ML; MG/100ML; MG/100ML; MG/100ML
INJECTION, SOLUTION INTRAVENOUS CONTINUOUS PRN
Status: DISCONTINUED | OUTPATIENT
Start: 2024-04-15 | End: 2024-04-15 | Stop reason: SDUPTHER

## 2024-04-15 RX ORDER — SODIUM CHLORIDE 9 MG/ML
INJECTION, SOLUTION INTRAVENOUS PRN
Status: DISCONTINUED | OUTPATIENT
Start: 2024-04-15 | End: 2024-04-15 | Stop reason: HOSPADM

## 2024-04-15 RX ORDER — SODIUM CHLORIDE 0.9 % (FLUSH) 0.9 %
5-40 SYRINGE (ML) INJECTION PRN
Status: DISCONTINUED | OUTPATIENT
Start: 2024-04-15 | End: 2024-04-15 | Stop reason: HOSPADM

## 2024-04-15 RX ORDER — NALOXONE HYDROCHLORIDE 0.4 MG/ML
INJECTION, SOLUTION INTRAMUSCULAR; INTRAVENOUS; SUBCUTANEOUS PRN
Status: DISCONTINUED | OUTPATIENT
Start: 2024-04-15 | End: 2024-04-15 | Stop reason: HOSPADM

## 2024-04-15 RX ORDER — ONDANSETRON 2 MG/ML
4 INJECTION INTRAMUSCULAR; INTRAVENOUS
Status: DISCONTINUED | OUTPATIENT
Start: 2024-04-15 | End: 2024-04-15 | Stop reason: HOSPADM

## 2024-04-15 RX ORDER — LIDOCAINE HYDROCHLORIDE 10 MG/ML
1 INJECTION, SOLUTION INFILTRATION; PERINEURAL
Status: DISCONTINUED | OUTPATIENT
Start: 2024-04-15 | End: 2024-04-15 | Stop reason: HOSPADM

## 2024-04-15 RX ORDER — PANTOPRAZOLE SODIUM 40 MG/1
40 TABLET, DELAYED RELEASE ORAL 2 TIMES DAILY
Qty: 180 TABLET | Refills: 1 | Status: SHIPPED | OUTPATIENT
Start: 2024-04-15

## 2024-04-15 RX ORDER — SODIUM CHLORIDE, SODIUM LACTATE, POTASSIUM CHLORIDE, CALCIUM CHLORIDE 600; 310; 30; 20 MG/100ML; MG/100ML; MG/100ML; MG/100ML
INJECTION, SOLUTION INTRAVENOUS CONTINUOUS
Status: DISCONTINUED | OUTPATIENT
Start: 2024-04-15 | End: 2024-04-15 | Stop reason: HOSPADM

## 2024-04-15 RX ORDER — SODIUM CHLORIDE 0.9 % (FLUSH) 0.9 %
5-40 SYRINGE (ML) INJECTION EVERY 12 HOURS SCHEDULED
Status: DISCONTINUED | OUTPATIENT
Start: 2024-04-15 | End: 2024-04-15 | Stop reason: HOSPADM

## 2024-04-15 RX ORDER — FENTANYL CITRATE 50 UG/ML
25 INJECTION, SOLUTION INTRAMUSCULAR; INTRAVENOUS
Status: DISCONTINUED | OUTPATIENT
Start: 2024-04-15 | End: 2024-04-15 | Stop reason: HOSPADM

## 2024-04-15 RX ORDER — PROPOFOL 10 MG/ML
INJECTION, EMULSION INTRAVENOUS PRN
Status: DISCONTINUED | OUTPATIENT
Start: 2024-04-15 | End: 2024-04-15 | Stop reason: SDUPTHER

## 2024-04-15 RX ORDER — FENTANYL CITRATE 50 UG/ML
50 INJECTION, SOLUTION INTRAMUSCULAR; INTRAVENOUS EVERY 5 MIN PRN
Status: DISCONTINUED | OUTPATIENT
Start: 2024-04-15 | End: 2024-04-15 | Stop reason: HOSPADM

## 2024-04-15 RX ORDER — DIPHENHYDRAMINE HYDROCHLORIDE 50 MG/ML
12.5 INJECTION INTRAMUSCULAR; INTRAVENOUS
Status: DISCONTINUED | OUTPATIENT
Start: 2024-04-15 | End: 2024-04-15 | Stop reason: HOSPADM

## 2024-04-15 RX ORDER — FENTANYL CITRATE 50 UG/ML
25 INJECTION, SOLUTION INTRAMUSCULAR; INTRAVENOUS EVERY 5 MIN PRN
Status: DISCONTINUED | OUTPATIENT
Start: 2024-04-15 | End: 2024-04-15 | Stop reason: HOSPADM

## 2024-04-15 RX ORDER — MIDAZOLAM HYDROCHLORIDE 1 MG/ML
INJECTION INTRAMUSCULAR; INTRAVENOUS PRN
Status: DISCONTINUED | OUTPATIENT
Start: 2024-04-15 | End: 2024-04-15 | Stop reason: SDUPTHER

## 2024-04-15 RX ORDER — FENTANYL CITRATE 50 UG/ML
50 INJECTION, SOLUTION INTRAMUSCULAR; INTRAVENOUS
Status: DISCONTINUED | OUTPATIENT
Start: 2024-04-15 | End: 2024-04-15 | Stop reason: HOSPADM

## 2024-04-15 RX ADMIN — PROPOFOL 20 MG: 10 INJECTION, EMULSION INTRAVENOUS at 07:18

## 2024-04-15 RX ADMIN — SODIUM CHLORIDE, POTASSIUM CHLORIDE, SODIUM LACTATE AND CALCIUM CHLORIDE: 600; 310; 30; 20 INJECTION, SOLUTION INTRAVENOUS at 07:13

## 2024-04-15 RX ADMIN — PROPOFOL 20 MG: 10 INJECTION, EMULSION INTRAVENOUS at 07:22

## 2024-04-15 RX ADMIN — PROPOFOL 25 MG: 10 INJECTION, EMULSION INTRAVENOUS at 07:20

## 2024-04-15 RX ADMIN — PROPOFOL 20 MG: 10 INJECTION, EMULSION INTRAVENOUS at 07:24

## 2024-04-15 RX ADMIN — MIDAZOLAM 2 MG: 1 INJECTION INTRAMUSCULAR; INTRAVENOUS at 07:18

## 2024-04-15 RX ADMIN — PROPOFOL 50 MG: 10 INJECTION, EMULSION INTRAVENOUS at 07:21

## 2024-04-15 ASSESSMENT — PAIN - FUNCTIONAL ASSESSMENT: PAIN_FUNCTIONAL_ASSESSMENT: 0-10

## 2024-04-15 ASSESSMENT — PAIN SCALES - GENERAL: PAINLEVEL_OUTOF10: 0

## 2024-04-15 NOTE — DISCHARGE INSTRUCTIONS
POST-ENDOSCOPY INSTRUCTIONS    1. ACTIVITY   No driving, operating machinery, or making important decisions for 24 hours.    Resume normal activity after 24 hours.  You may return to work after 24 hours.    2. DIET    EGD: Resume your usual diet unless specified below.                Diet Modification: Regular    3. MEDICATIONS  (Do not consume alcohol, tranquilizers, or sleeping medications for 24 hours unless advised by your physician)                 Resume your usual medications    4. PHYSICIAN FOLLOW-UP                 Please continue with your previously scheduled appointments                 See your primary care physician as planned.      6. NORMAL CHANGES YOU MAY EXPERIENCE AFTER ENDOSCOPY:      EGD:  Sore throat, some abdominal pain and cramping.            7. CALL YOUR PHYSICIAN IF YOU EXPERIENCE ANY OF THE FOLLOWING      A.  Passing blood rectally or vomiting blood (color may be red or black)      B.  Severe abdominal pain or tenderness (that is not relieved by passing air)      C.  Fever, chills, or excessive sweating      D.  Persistent nausea or vomiting      E.  Redness or swelling at the IV site    If you have additional questions, PLEASE call your doctor or the [facility] center at [phone]

## 2024-04-15 NOTE — H&P
Subjective     The patient is a 59 y.o. female who is here to undergo EGD for ulce       Past Medical History:   Diagnosis Date    Abdominal pain     COVID-19     x2    COVID-19 vaccine administered     Depression     Eczema     Environmental allergies     dust mites,etc    Gastrojejunal ulcer     Hyperlipidemia     Hypertension     Kidney stones     Nausea & vomiting     Under care of team     pcp dr whalen    Wears glasses    .    Review of Systems - A complete 14 point review of systems was performed.  All was negative unless otherwise documented in HPI.    Allergies:  Allergies   Allergen Reactions    Ibuprofen      Gastric bypass    Amoxicillin     Ciprofloxacin        Past Surgical History:  Past Surgical History:   Procedure Laterality Date    BREAST SURGERY Bilateral     augmentation    CHOLECYSTECTOMY      2002    GASTRIC BYPASS SURGERY      2002 in [place]    OVARIAN CYST REMOVAL      RHINOPLASTY      TYMPANOPLASTY         Family History:  History reviewed. No pertinent family history.    Social History:  Social History     Socioeconomic History    Marital status:      Spouse name: Not on file    Number of children: Not on file    Years of education: Not on file    Highest education level: Not on file   Occupational History    Not on file   Tobacco Use    Smoking status: Former     Types: Cigarettes    Smokeless tobacco: Not on file   Substance and Sexual Activity    Alcohol use: No    Drug use: No    Sexual activity: Not on file   Other Topics Concern    Not on file   Social History Narrative    Not on file     Social Determinants of Health     Financial Resource Strain: Not on file   Food Insecurity: Not on file   Transportation Needs: Not on file   Physical Activity: Not on file   Stress: Not on file   Social Connections: Not on file   Intimate Partner Violence: Not on file   Housing Stability: Not on file       Current Facility-Administered Medications   Medication Dose Route Frequency Provider

## 2024-04-15 NOTE — OP NOTE
[facility] OR  [address] [address]  Dept: [phone]  Loc: [phone]        [facility] OR  [address] [address]  Dept: [phone]  Loc: [phone]    Preoperative Diagnosis:  Gastrojejunal Ulcer    Postoperative Diagnosis:   Epigastric pain  No Ulcer on endoscopy  Suspect ulcer in remnant stomach as source of pain  Denver limb normal for 20 cm  4 cm pouch  No stomal dilation    Procedure: Esophagogastroduodenoscopy with biopsy    Surgeon: Iftikhar Erickson DO    Findings: As above    EBL: none    Anesthesia: MAC, See Anesthesia Records    Specimen:    Gastric Pouch    Operative Narrative:  The risks and benefits of the procedure were explained to the patient in detail, including but not limited to: bleeding, infection, need for further surgery, damage to surrounding structures and perforation.  The patient consented with the procedure.    The patient was taken to the endoscopic suite and placed in lateral position.  Oxygen was administered via nasal cannula and a mouth guard placed.  MAC was administered via the anesthesia team.  The endoscope was then advanced into the oropharynx and passed into the esophagus under direct visualization.  The scope was further advanced under direct visualization into the esophageal lumen and down into the gastric pouch.   The scope was advanced past the anastomosis and the denver limb surveyed.  The denver limb appeared patent and without signs of ischemia.  The scope was withdrawn and the blind pouch surveyed, without lesion.  The scope withdrawn and a cold forceps biopsy taken of the gastric pouch for H pylori.  Hemostasis noted.  The denver limb and gastric pouch without trauma or bleeding. The esophagus without lesion    The patient tolerated the procedure well    PPI

## 2024-04-15 NOTE — BRIEF OP NOTE
Brief Postoperative Note      Patient: Reta Flores  YOB: 1965  MRN: [id number]    Date of Procedure: 4/15/2024    Pre-Op Diagnosis Codes:     * GJU (gastrojejunal ulcer) [K28.9]    Post-Op Diagnosis: Same       Procedure(s):  ESOPHAGOGASTRODUODENOSCOPY - GI SCHEDULED    Surgeon(s):  Iftikhar Erickson DO    Assistant:  First Assistant: Abigail Mora RN    Anesthesia: Monitor Anesthesia Care    Estimated Blood Loss (mL): Minimal    Complications: None    Specimens:   ID Type Source Tests Collected by Time Destination   A : GASTRIC POUCH BX Tissue Stomach SURGICAL PATHOLOGY Iftikhar Erickson DO 4/15/2024 0727        Implants:  * No implants in log *      Drains: * No LDAs found *        Electronically signed by Iftikhar Erickson DO on 4/15/2024 at 7:33 AM

## 2024-04-16 LAB — SURGICAL PATHOLOGY REPORT: NORMAL

## 2024-04-16 NOTE — ANESTHESIA POSTPROCEDURE EVALUATION
Department of Anesthesiology  Postprocedure Note    Patient: Reta Flores  MRN: [id number]  YOB: 1965  Date of evaluation: 4/16/2024    Procedure Summary       Date: 04/15/24 Room / Location: [address]    Anesthesia Start: 0713 Anesthesia Stop: 0743    Procedure: ESOPHAGOGASTRODUODENOSCOPY - GI SCHEDULED Diagnosis:       GJU (gastrojejunal ulcer)      (GJU (gastrojejunal ulcer) [K28.9])    Surgeons: Iftikhar Erickson DO Responsible Provider: Ramses Lemon MD    Anesthesia Type: MAC ASA Status: 2            Anesthesia Type: No value filed.    Maldonado Phase I: Maldonado Score: 10    Maldonado Phase II: Maldonado Score: 10    Anesthesia Post Evaluation    Patient location during evaluation: PACU  Patient participation: complete - patient participated  Level of consciousness: awake and alert  Airway patency: patent  Nausea & Vomiting: no nausea and no vomiting  Cardiovascular status: blood pressure returned to baseline  Respiratory status: acceptable  Hydration status: euvolemic  Pain management: adequate    No notable events documented.

## 2024-04-17 PROBLEM — K25.3 ACUTE GASTRIC ULCER WITHOUT HEMORRHAGE OR PERFORATION: Status: ACTIVE | Noted: 2024-04-17

## 2024-04-29 DIAGNOSIS — K90.89 OTHER SPECIFIED INTESTINAL MALABSORPTION: ICD-10-CM

## 2024-04-29 RX ORDER — SUCRALFATE 1 G/1
1 TABLET ORAL 4 TIMES DAILY
Qty: 360 TABLET | Refills: 1 | Status: SHIPPED | OUTPATIENT
Start: 2024-04-29

## 2024-05-16 ENCOUNTER — HOSPITAL ENCOUNTER (EMERGENCY)
Age: 59
Discharge: HOME | End: 2024-05-16
Attending: EMERGENCY MEDICINE
Payer: COMMERCIAL

## 2024-05-16 VITALS
RESPIRATION RATE: 18 BRPM | BODY MASS INDEX: 28.25 KG/M2 | WEIGHT: 180 LBS | DIASTOLIC BLOOD PRESSURE: 71 MMHG | HEIGHT: 67 IN | SYSTOLIC BLOOD PRESSURE: 127 MMHG | TEMPERATURE: 98.3 F | OXYGEN SATURATION: 98 % | HEART RATE: 47 BPM

## 2024-05-16 DIAGNOSIS — R00.1 SINUS BRADYCARDIA: Primary | ICD-10-CM

## 2024-05-16 LAB
AMORPH SED URNS QL MICRO: ABNORMAL
ANION GAP SERPL CALCULATED.3IONS-SCNC: 11 MMOL/L (ref 9–17)
BASOPHILS # BLD: 0.05 K/UL (ref 0–0.2)
BASOPHILS NFR BLD: 1 % (ref 0–2)
BILIRUB UR QL STRIP: NEGATIVE
BUN SERPL-MCNC: 11 MG/DL (ref 6–20)
BUN/CREAT SERPL: 12 (ref 9–20)
CALCIUM SERPL-MCNC: 9.6 MG/DL (ref 8.6–10.4)
CHLORIDE SERPL-SCNC: 96 MMOL/L (ref 98–107)
CLARITY UR: CLEAR
CO2 SERPL-SCNC: 26 MMOL/L (ref 20–31)
COLOR UR: YELLOW
CREAT SERPL-MCNC: 0.9 MG/DL (ref 0.5–0.9)
EKG ATRIAL RATE: 54 BPM
EKG P AXIS: 24 DEGREES
EKG P-R INTERVAL: 166 MS
EKG Q-T INTERVAL: 454 MS
EKG QRS DURATION: 82 MS
EKG QTC CALCULATION (BAZETT): 430 MS
EKG R AXIS: 1 DEGREES
EKG T AXIS: 60 DEGREES
EKG VENTRICULAR RATE: 54 BPM
EOSINOPHIL # BLD: 0.13 K/UL (ref 0–0.44)
EOSINOPHILS RELATIVE PERCENT: 3 % (ref 1–4)
EPI CELLS #/AREA URNS HPF: ABNORMAL /HPF (ref 0–5)
ERYTHROCYTE [DISTWIDTH] IN BLOOD BY AUTOMATED COUNT: 11.9 % (ref 11.8–14.4)
GFR, ESTIMATED: 74 ML/MIN/1.73M2
GLUCOSE SERPL-MCNC: 103 MG/DL (ref 70–99)
GLUCOSE UR STRIP-MCNC: NEGATIVE MG/DL
HCT VFR BLD AUTO: 39.5 % (ref 36.3–47.1)
HGB BLD-MCNC: 13.4 G/DL (ref 11.9–15.1)
HGB UR QL STRIP.AUTO: ABNORMAL
IMM GRANULOCYTES # BLD AUTO: 0.01 K/UL (ref 0–0.3)
IMM GRANULOCYTES NFR BLD: 0 %
KETONES UR STRIP-MCNC: NEGATIVE MG/DL
LEUKOCYTE ESTERASE UR QL STRIP: NEGATIVE
LYMPHOCYTES NFR BLD: 1.8 K/UL (ref 1.1–3.7)
LYMPHOCYTES RELATIVE PERCENT: 35 % (ref 24–43)
MCH RBC QN AUTO: 31.7 PG (ref 25.2–33.5)
MCHC RBC AUTO-ENTMCNC: 33.9 G/DL (ref 28.4–34.8)
MCV RBC AUTO: 93.4 FL (ref 82.6–102.9)
MONOCYTES NFR BLD: 0.34 K/UL (ref 0.1–1.2)
MONOCYTES NFR BLD: 7 % (ref 3–12)
NEUTROPHILS NFR BLD: 54 % (ref 36–65)
NEUTS SEG NFR BLD: 2.81 K/UL (ref 1.5–8.1)
NITRITE UR QL STRIP: NEGATIVE
NRBC BLD-RTO: 0 PER 100 WBC
PH UR STRIP: 6 [PH] (ref 5–8)
PLATELET # BLD AUTO: 155 K/UL (ref 138–453)
PMV BLD AUTO: 10.4 FL (ref 8.1–13.5)
POTASSIUM SERPL-SCNC: 3.8 MMOL/L (ref 3.7–5.3)
PROT UR STRIP-MCNC: NEGATIVE MG/DL
RBC # BLD AUTO: 4.23 M/UL (ref 3.95–5.11)
RBC #/AREA URNS HPF: ABNORMAL /HPF (ref 0–2)
SODIUM SERPL-SCNC: 133 MMOL/L (ref 135–144)
SP GR UR STRIP: 1.01 (ref 1–1.03)
TROPONIN I SERPL HS-MCNC: <6 NG/L (ref 0–14)
TSH SERPL DL<=0.05 MIU/L-ACNC: 2.31 UIU/ML (ref 0.3–5)
UROBILINOGEN UR STRIP-ACNC: NORMAL EU/DL (ref 0–1)
WBC #/AREA URNS HPF: ABNORMAL /HPF (ref 0–5)
WBC OTHER # BLD: 5.1 K/UL (ref 3.5–11.3)

## 2024-05-16 PROCEDURE — 99284 EMERGENCY DEPT VISIT MOD MDM: CPT

## 2024-05-16 PROCEDURE — 93005 ELECTROCARDIOGRAM TRACING: CPT | Performed by: EMERGENCY MEDICINE

## 2024-05-16 PROCEDURE — 2580000003 HC RX 258

## 2024-05-16 PROCEDURE — 84484 ASSAY OF TROPONIN QUANT: CPT

## 2024-05-16 PROCEDURE — 80048 BASIC METABOLIC PNL TOTAL CA: CPT

## 2024-05-16 PROCEDURE — 96374 THER/PROPH/DIAG INJ IV PUSH: CPT

## 2024-05-16 PROCEDURE — 81001 URINALYSIS AUTO W/SCOPE: CPT

## 2024-05-16 PROCEDURE — 6360000002 HC RX W HCPCS

## 2024-05-16 PROCEDURE — 85025 COMPLETE CBC W/AUTO DIFF WBC: CPT

## 2024-05-16 PROCEDURE — 84443 ASSAY THYROID STIM HORMONE: CPT

## 2024-05-16 PROCEDURE — 6370000000 HC RX 637 (ALT 250 FOR IP)

## 2024-05-16 RX ORDER — ONDANSETRON 2 MG/ML
4 INJECTION INTRAMUSCULAR; INTRAVENOUS ONCE
Status: COMPLETED | OUTPATIENT
Start: 2024-05-16 | End: 2024-05-16

## 2024-05-16 RX ORDER — 0.9 % SODIUM CHLORIDE 0.9 %
1000 INTRAVENOUS SOLUTION INTRAVENOUS ONCE
Status: COMPLETED | OUTPATIENT
Start: 2024-05-16 | End: 2024-05-16

## 2024-05-16 RX ORDER — ACETAMINOPHEN 325 MG/1
650 TABLET ORAL ONCE
Status: COMPLETED | OUTPATIENT
Start: 2024-05-16 | End: 2024-05-16

## 2024-05-16 RX ORDER — PROCHLORPERAZINE EDISYLATE 5 MG/ML
10 INJECTION INTRAMUSCULAR; INTRAVENOUS EVERY 6 HOURS PRN
Status: CANCELLED | OUTPATIENT
Start: 2024-05-16

## 2024-05-16 RX ORDER — MAGNESIUM SULFATE IN WATER 40 MG/ML
2000 INJECTION, SOLUTION INTRAVENOUS ONCE
Status: CANCELLED | OUTPATIENT
Start: 2024-05-16 | End: 2024-05-16

## 2024-05-16 RX ADMIN — SODIUM CHLORIDE 1000 ML: 9 INJECTION, SOLUTION INTRAVENOUS at 14:29

## 2024-05-16 RX ADMIN — ACETAMINOPHEN 650 MG: 325 TABLET ORAL at 14:26

## 2024-05-16 RX ADMIN — ONDANSETRON 4 MG: 2 INJECTION INTRAMUSCULAR; INTRAVENOUS at 14:05

## 2024-05-16 ASSESSMENT — LIFESTYLE VARIABLES
HOW OFTEN DO YOU HAVE A DRINK CONTAINING ALCOHOL: NEVER
HOW MANY STANDARD DRINKS CONTAINING ALCOHOL DO YOU HAVE ON A TYPICAL DAY: PATIENT DOES NOT DRINK

## 2024-05-16 ASSESSMENT — PAIN SCALES - GENERAL: PAINLEVEL_OUTOF10: 8

## 2024-05-16 ASSESSMENT — ENCOUNTER SYMPTOMS
SHORTNESS OF BREATH: 0
DIARRHEA: 0
RHINORRHEA: 0
ABDOMINAL PAIN: 0
BACK PAIN: 0
SORE THROAT: 0
NAUSEA: 0

## 2024-05-16 ASSESSMENT — PAIN - FUNCTIONAL ASSESSMENT: PAIN_FUNCTIONAL_ASSESSMENT: NONE - DENIES PAIN

## 2024-05-16 NOTE — ED NOTES
Pt to ed c/o palpitations that started about 30 min PTA. Pt also c/o dizziness and HA. Pt rates HA pain 8/10. Pt a/o x4. Respirations equal and non labored. Call light in reach. Bed locked and in lowest position. Family at bedside. Pt placed on full cardiac monitor.

## 2024-05-16 NOTE — DISCHARGE INSTRUCTIONS
Please follow up with PCP to schedule an appointment to reassess patient's need to be on metoprolol.  Would benefit from discontinue metoprolol and switch to ACE/ARB/ARB's or another/ARB's or another hypertensive medication.    If symptoms worsen, start having increased dizziness, chest pain, trouble breathing please return to the ED to be evaluated further.

## 2024-05-16 NOTE — ED PROVIDER NOTES
[facility] ED  Emergency Department Encounter  Emergency Medicine Resident     Pt Name: Reta Flores  MRN: [id number]  Birthdate 1965  Date of evaluation: 5/16/24  PCP:  Lizzeth Story MD    CHIEF COMPLAINT       Chief Complaint   Patient presents with    Palpitations     Pt reports palpitations for the past half hour.  Pt states that she is prescribed metoprolol but states that her dose was cut in half a couple of months ago.  Pt reports dizziness at this time        HISTORY OFPRESENT ILLNESS  (Location/Symptom, Timing/Onset, Context/Setting, Quality, Duration, Modifying Factors,Severity.)      Reta Flores is a 59 y.o. female who presents with past medical history of essential hypertension, gastric ulcers presenting today with palpitations to the ED.  Patient states that the symptoms started approximately 45 minutes ago prior to presentation.  Stated that she is currently on metoprolol for essential hypertension however it has been weaned down by her PCP over the last few months as she started to lose weight and her blood pressure has been improving.  Stated that 45 minutes ago she started having dizziness, palpitations as well as some nausea.  Denies any chest pain or any other previous history of atrial fibrillation or previous cardiovascular history.  In the ED patient's pulse was 55, EKG showed sinus bradycardia with left axis deviation.  Most likely hypertensive with blood pressure at 164/77.  Patient reevaluation also stated that she is having headache behind her eyes.  Stated that it has just started, is not the worst headache that she has had.  Will evaluate patient for sinus bradycardia as well as thyroid dysfunction.  Also rule out MI or other arrhythmias.          PAST MEDICAL / SURGICAL / SOCIAL / FAMILY HISTORY      has a past medical history of Abdominal pain, COVID-19, COVID-19 vaccine administered, Depression, Eczema, Environmental allergies, Gastrojejunal ulcer, Hyperlipidemia,

## 2024-06-14 ENCOUNTER — HOSPITAL ENCOUNTER (EMERGENCY)
Age: 59
Discharge: HOME | End: 2024-06-14
Attending: EMERGENCY MEDICINE
Payer: COMMERCIAL

## 2024-06-14 VITALS
SYSTOLIC BLOOD PRESSURE: 117 MMHG | DIASTOLIC BLOOD PRESSURE: 63 MMHG | RESPIRATION RATE: 18 BRPM | WEIGHT: 185 LBS | HEART RATE: 60 BPM | HEIGHT: 67 IN | OXYGEN SATURATION: 93 % | BODY MASS INDEX: 29.03 KG/M2 | TEMPERATURE: 98.6 F

## 2024-06-14 DIAGNOSIS — R10.13 EPIGASTRIC PAIN: Primary | ICD-10-CM

## 2024-06-14 LAB
ALBUMIN SERPL-MCNC: 4.3 G/DL (ref 3.5–5.2)
ALP SERPL-CCNC: 106 U/L (ref 35–104)
ALT SERPL-CCNC: 30 U/L (ref 5–33)
ANION GAP SERPL CALCULATED.3IONS-SCNC: 12 MMOL/L (ref 9–17)
AST SERPL-CCNC: 30 U/L
BASOPHILS # BLD: 0.04 K/UL (ref 0–0.2)
BASOPHILS NFR BLD: 1 % (ref 0–2)
BILIRUB DIRECT SERPL-MCNC: 0.1 MG/DL
BILIRUB INDIRECT SERPL-MCNC: 0.3 MG/DL (ref 0–1)
BILIRUB SERPL-MCNC: 0.4 MG/DL (ref 0.3–1.2)
BUN SERPL-MCNC: 10 MG/DL (ref 6–20)
BUN/CREAT SERPL: 13 (ref 9–20)
CALCIUM SERPL-MCNC: 9.5 MG/DL (ref 8.6–10.4)
CHLORIDE SERPL-SCNC: 100 MMOL/L (ref 98–107)
CO2 SERPL-SCNC: 26 MMOL/L (ref 20–31)
CREAT SERPL-MCNC: 0.8 MG/DL (ref 0.5–0.9)
EOSINOPHIL # BLD: 0.22 K/UL (ref 0–0.44)
EOSINOPHILS RELATIVE PERCENT: 5 % (ref 1–4)
ERYTHROCYTE [DISTWIDTH] IN BLOOD BY AUTOMATED COUNT: 12.1 % (ref 11.8–14.4)
GFR, ESTIMATED: 85 ML/MIN/1.73M2
GLUCOSE SERPL-MCNC: 95 MG/DL (ref 70–99)
HCT VFR BLD AUTO: 38.6 % (ref 36.3–47.1)
HGB BLD-MCNC: 13.1 G/DL (ref 11.9–15.1)
IMM GRANULOCYTES # BLD AUTO: 0.01 K/UL (ref 0–0.3)
IMM GRANULOCYTES NFR BLD: 0 %
LIPASE SERPL-CCNC: 109 U/L (ref 13–60)
LYMPHOCYTES NFR BLD: 1.9 K/UL (ref 1.1–3.7)
LYMPHOCYTES RELATIVE PERCENT: 43 % (ref 24–43)
MCH RBC QN AUTO: 31.3 PG (ref 25.2–33.5)
MCHC RBC AUTO-ENTMCNC: 33.9 G/DL (ref 28.4–34.8)
MCV RBC AUTO: 92.3 FL (ref 82.6–102.9)
MONOCYTES NFR BLD: 0.37 K/UL (ref 0.1–1.2)
MONOCYTES NFR BLD: 9 % (ref 3–12)
MYOGLOBIN SERPL-MCNC: 23 NG/ML (ref 25–58)
NEUTROPHILS NFR BLD: 42 % (ref 36–65)
NEUTS SEG NFR BLD: 1.82 K/UL (ref 1.5–8.1)
NRBC BLD-RTO: 0 PER 100 WBC
PLATELET # BLD AUTO: 184 K/UL (ref 138–453)
PMV BLD AUTO: 9.8 FL (ref 8.1–13.5)
POTASSIUM SERPL-SCNC: 3.8 MMOL/L (ref 3.7–5.3)
PROT SERPL-MCNC: 6.6 G/DL (ref 6.4–8.3)
RBC # BLD AUTO: 4.18 M/UL (ref 3.95–5.11)
SODIUM SERPL-SCNC: 138 MMOL/L (ref 135–144)
TROPONIN I SERPL HS-MCNC: <6 NG/L (ref 0–14)
WBC OTHER # BLD: 4.4 K/UL (ref 3.5–11.3)

## 2024-06-14 PROCEDURE — 6370000000 HC RX 637 (ALT 250 FOR IP): Performed by: NURSE PRACTITIONER

## 2024-06-14 PROCEDURE — 84484 ASSAY OF TROPONIN QUANT: CPT

## 2024-06-14 PROCEDURE — 85025 COMPLETE CBC W/AUTO DIFF WBC: CPT

## 2024-06-14 PROCEDURE — 83874 ASSAY OF MYOGLOBIN: CPT

## 2024-06-14 PROCEDURE — 99284 EMERGENCY DEPT VISIT MOD MDM: CPT

## 2024-06-14 PROCEDURE — 6360000002 HC RX W HCPCS: Performed by: NURSE PRACTITIONER

## 2024-06-14 PROCEDURE — 96374 THER/PROPH/DIAG INJ IV PUSH: CPT

## 2024-06-14 PROCEDURE — 80076 HEPATIC FUNCTION PANEL: CPT

## 2024-06-14 PROCEDURE — 80048 BASIC METABOLIC PNL TOTAL CA: CPT

## 2024-06-14 PROCEDURE — 83690 ASSAY OF LIPASE: CPT

## 2024-06-14 PROCEDURE — 96375 TX/PRO/DX INJ NEW DRUG ADDON: CPT

## 2024-06-14 RX ORDER — ONDANSETRON 2 MG/ML
4 INJECTION INTRAMUSCULAR; INTRAVENOUS ONCE
Status: COMPLETED | OUTPATIENT
Start: 2024-06-14 | End: 2024-06-14

## 2024-06-14 RX ORDER — FENTANYL CITRATE 0.05 MG/ML
50 INJECTION, SOLUTION INTRAMUSCULAR; INTRAVENOUS ONCE
Status: COMPLETED | OUTPATIENT
Start: 2024-06-14 | End: 2024-06-14

## 2024-06-14 RX ADMIN — ONDANSETRON 4 MG: 2 INJECTION INTRAMUSCULAR; INTRAVENOUS at 21:15

## 2024-06-14 RX ADMIN — LIDOCAINE HYDROCHLORIDE: 20 SOLUTION ORAL at 21:33

## 2024-06-14 RX ADMIN — FENTANYL CITRATE 50 MCG: 0.05 INJECTION, SOLUTION INTRAMUSCULAR; INTRAVENOUS at 21:15

## 2024-06-14 ASSESSMENT — ENCOUNTER SYMPTOMS
VOMITING: 0
COLOR CHANGE: 0
DIARRHEA: 0
BACK PAIN: 0
ABDOMINAL PAIN: 1
SHORTNESS OF BREATH: 0
NAUSEA: 1
COUGH: 0

## 2024-06-14 ASSESSMENT — PAIN DESCRIPTION - LOCATION
LOCATION: ABDOMEN
LOCATION: ABDOMEN

## 2024-06-14 ASSESSMENT — PAIN - FUNCTIONAL ASSESSMENT: PAIN_FUNCTIONAL_ASSESSMENT: 0-10

## 2024-06-14 ASSESSMENT — PAIN DESCRIPTION - ORIENTATION: ORIENTATION: RIGHT;UPPER

## 2024-06-14 ASSESSMENT — PAIN SCALES - GENERAL
PAINLEVEL_OUTOF10: 9
PAINLEVEL_OUTOF10: 9

## 2024-06-15 LAB
EKG ATRIAL RATE: 63 BPM
EKG P-R INTERVAL: 148 MS
EKG Q-T INTERVAL: 408 MS
EKG QRS DURATION: 70 MS
EKG QTC CALCULATION (BAZETT): 417 MS
EKG R AXIS: 6 DEGREES
EKG T AXIS: 61 DEGREES
EKG VENTRICULAR RATE: 63 BPM

## 2024-06-15 NOTE — ED PROVIDER NOTES
[facility] ED  eMERGENCY dEPARTMENT eNCOUnter      Pt Name: Reta Flores  MRN: [id number]  Birthdate 1965  Date of evaluation: 6/14/2024  Provider: YADI Romero CNP    CHIEF COMPLAINT       Chief Complaint   Patient presents with    Abdominal Pain     Known gastric ulcer, Protonix and Carafate did not help with pain.           HISTORY OF PRESENT ILLNESS  (Location/Symptom, Timing/Onset, Context/Setting, Quality, Duration, Modifying Factors, Severity.)   Reta Flores is a 59 y.o. female who presents to the emergency department. C/o epigastric, RUQ pain. It woke her up early this morning. She has hx gastric ulcer. Reports a stabbing pain. She has nausea. Denies fever, chills, emesis, diarrhea, urinary sx. Denies CP, SOB, dizziness. Rates her pain 9/10. She took Protonix and Carafate without relief. She is accompanied by her daughter.      Nursing Notes were reviewed.    ALLERGIES     Ibuprofen, Amoxicillin, and Ciprofloxacin    CURRENT MEDICATIONS       Previous Medications    ALBUTEROL SULFATE HFA (PROVENTIL;VENTOLIN;PROAIR) 108 (90 BASE) MCG/ACT INHALER    Inhale 2 puffs into the lungs every 6 hours as needed for Shortness of Breath    AMMONIUM LACTATE (LAC-HYDRIN) 12 % LOTION    Apply 1 Bottle topically as needed for Dry Skin    ATORVASTATIN (LIPITOR) 10 MG TABLET    TAKE 1 TABLET ONCE DAILY ORALLY ONCE A DAY 90 DAYS    AZELASTINE  MCG/SPRAY SOLN    1 spray by Nasal route daily    CLONAZEPAM (KLONOPIN) 1 MG TABLET    Take 1 tablet by mouth at bedtime.    DICYCLOMINE (BENTYL) 10 MG CAPSULE    Take 1 capsule by mouth every 6 hours as needed (cramps).    EPINEPHRINE (EPIPEN) 0.3 MG/0.3ML SOAJ INJECTION    AS DIRECTED INJECTION ONCE FOR SEVERE ALLERGIC REACTION 30 DAYS    FLUTICASONE (FLONASE) 50 MCG/ACT NASAL SPRAY    1 spray by Nasal route daily    HOUSE DUST MITE ALLERGEN EXTRACT (ODACTRA) 12 SQ-HDM SUBL SUBLINGUAL TABLET    PLEASE SEE ATTACHED FOR DETAILED DIRECTIONS    LAMOTRIGINE

## 2024-06-15 NOTE — ED PROVIDER NOTES
EMERGENCY DEPARTMENT ENCOUNTER   ATTENDING ATTESTATION     Pt Name: Reta Flores  MRN: [id number]  Birthdate 1965  Date of evaluation: 6/15/24   Reta Flores is a 59 y.o. female with CC: Abdominal Pain (Known gastric ulcer, Protonix and Carafate did not help with pain.  )    MDM:   I performed a substantive part of the MDM during the patient's E/M visit. I personally made or approved the documented management plan and acknowledge its risk of complications.    Independent Interpretation of EKG: Sinus with a ventricular rate 63  Some motion artifact present  No significant ST or T wave changes    QTc 414         CRITICAL CARE:           RADIOLOGY:All plain film, CT, MRI, and formal ultrasound images (except ED bedside ultrasound) are read by the radiologist, see reports below, unless otherwise noted in MDM or here.  No orders to display     LABS: All lab results were reviewed by myself, and all abnormals are listed below.  Labs Reviewed   CBC WITH AUTO DIFFERENTIAL - Abnormal; Notable for the following components:       Result Value    Eosinophils % 5 (*)     All other components within normal limits   HEPATIC FUNCTION PANEL - Abnormal; Notable for the following components:    Alkaline Phosphatase 106 (*)     All other components within normal limits   LIPASE - Abnormal; Notable for the following components:    Lipase 109 (*)     All other components within normal limits   TROP/MYOGLOBIN - Abnormal; Notable for the following components:    Myoglobin 23 (*)     All other components within normal limits   BASIC METABOLIC PANEL     CONSULTS:  None  FINAL IMPRESSION      1. Epigastric pain            PASTMEDICAL HISTORY     Past Medical History:   Diagnosis Date    Abdominal pain     COVID-19     x2    COVID-19 vaccine administered     Depression     Eczema     Environmental allergies     dust mites,etc    Gastrojejunal ulcer     Hyperlipidemia     Hypertension     Kidney stones     Nausea & vomiting     Under care

## 2024-09-12 ENCOUNTER — HOSPITAL ENCOUNTER (EMERGENCY)
Age: 59
Discharge: HOME | End: 2024-09-12
Attending: EMERGENCY MEDICINE
Payer: COMMERCIAL

## 2024-09-12 VITALS
HEIGHT: 67 IN | RESPIRATION RATE: 12 BRPM | HEART RATE: 62 BPM | TEMPERATURE: 97.7 F | DIASTOLIC BLOOD PRESSURE: 74 MMHG | WEIGHT: 180 LBS | BODY MASS INDEX: 28.25 KG/M2 | OXYGEN SATURATION: 100 % | SYSTOLIC BLOOD PRESSURE: 125 MMHG

## 2024-09-12 DIAGNOSIS — K25.7 CHRONIC GASTRIC ULCER WITHOUT HEMORRHAGE AND WITHOUT PERFORATION: Primary | ICD-10-CM

## 2024-09-12 LAB
ALBUMIN SERPL-MCNC: 4.2 G/DL (ref 3.5–5.2)
ALP SERPL-CCNC: 85 U/L (ref 35–104)
ALT SERPL-CCNC: 24 U/L (ref 5–33)
ANION GAP SERPL CALCULATED.3IONS-SCNC: 13 MMOL/L (ref 9–17)
AST SERPL-CCNC: 34 U/L
BASOPHILS # BLD: 0.03 K/UL (ref 0–0.2)
BASOPHILS NFR BLD: 1 % (ref 0–2)
BILIRUB SERPL-MCNC: 0.6 MG/DL (ref 0.3–1.2)
BILIRUB UR QL STRIP: NEGATIVE
BUN SERPL-MCNC: 15 MG/DL (ref 6–20)
BUN/CREAT SERPL: 21 (ref 9–20)
CALCIUM SERPL-MCNC: 9.4 MG/DL (ref 8.6–10.4)
CHLORIDE SERPL-SCNC: 95 MMOL/L (ref 98–107)
CLARITY UR: CLEAR
CO2 SERPL-SCNC: 26 MMOL/L (ref 20–31)
COLOR UR: YELLOW
CREAT SERPL-MCNC: 0.7 MG/DL (ref 0.5–0.9)
EOSINOPHIL # BLD: 0.12 K/UL (ref 0–0.44)
EOSINOPHILS RELATIVE PERCENT: 3 % (ref 1–4)
ERYTHROCYTE [DISTWIDTH] IN BLOOD BY AUTOMATED COUNT: 12 % (ref 11.8–14.4)
GFR, ESTIMATED: >90 ML/MIN/1.73M2
GLUCOSE SERPL-MCNC: 91 MG/DL (ref 70–99)
GLUCOSE UR STRIP-MCNC: NEGATIVE MG/DL
HCT VFR BLD AUTO: 38.1 % (ref 36.3–47.1)
HGB BLD-MCNC: 13 G/DL (ref 11.9–15.1)
HGB UR QL STRIP.AUTO: NEGATIVE
IMM GRANULOCYTES # BLD AUTO: 0.01 K/UL (ref 0–0.3)
IMM GRANULOCYTES NFR BLD: 0 %
KETONES UR STRIP-MCNC: NEGATIVE MG/DL
LACTATE BLDV-SCNC: 0.9 MMOL/L (ref 0.5–2.2)
LEUKOCYTE ESTERASE UR QL STRIP: NEGATIVE
LYMPHOCYTES NFR BLD: 1.4 K/UL (ref 1.1–3.7)
LYMPHOCYTES RELATIVE PERCENT: 31 % (ref 24–43)
MCH RBC QN AUTO: 31.6 PG (ref 25.2–33.5)
MCHC RBC AUTO-ENTMCNC: 34.1 G/DL (ref 28.4–34.8)
MCV RBC AUTO: 92.5 FL (ref 82.6–102.9)
MONOCYTES NFR BLD: 0.32 K/UL (ref 0.1–1.2)
MONOCYTES NFR BLD: 7 % (ref 3–12)
NEUTROPHILS NFR BLD: 58 % (ref 36–65)
NEUTS SEG NFR BLD: 2.62 K/UL (ref 1.5–8.1)
NITRITE UR QL STRIP: NEGATIVE
NRBC BLD-RTO: 0 PER 100 WBC
PH UR STRIP: 6 [PH] (ref 5–8)
PLATELET # BLD AUTO: 154 K/UL (ref 138–453)
PMV BLD AUTO: 9.2 FL (ref 8.1–13.5)
POTASSIUM SERPL-SCNC: 3.8 MMOL/L (ref 3.7–5.3)
PROT SERPL-MCNC: 6.4 G/DL (ref 6.4–8.3)
PROT UR STRIP-MCNC: NEGATIVE MG/DL
RBC # BLD AUTO: 4.12 M/UL (ref 3.95–5.11)
SODIUM SERPL-SCNC: 134 MMOL/L (ref 135–144)
SP GR UR STRIP: 1.01 (ref 1–1.03)
UROBILINOGEN UR STRIP-ACNC: NORMAL EU/DL (ref 0–1)
WBC OTHER # BLD: 4.5 K/UL (ref 3.5–11.3)

## 2024-09-12 PROCEDURE — 2580000003 HC RX 258: Performed by: EMERGENCY MEDICINE

## 2024-09-12 PROCEDURE — 6370000000 HC RX 637 (ALT 250 FOR IP): Performed by: EMERGENCY MEDICINE

## 2024-09-12 PROCEDURE — 96374 THER/PROPH/DIAG INJ IV PUSH: CPT

## 2024-09-12 PROCEDURE — 83605 ASSAY OF LACTIC ACID: CPT

## 2024-09-12 PROCEDURE — 80053 COMPREHEN METABOLIC PANEL: CPT

## 2024-09-12 PROCEDURE — 2500000003 HC RX 250 WO HCPCS: Performed by: EMERGENCY MEDICINE

## 2024-09-12 PROCEDURE — 96375 TX/PRO/DX INJ NEW DRUG ADDON: CPT

## 2024-09-12 PROCEDURE — 99284 EMERGENCY DEPT VISIT MOD MDM: CPT

## 2024-09-12 PROCEDURE — 6360000002 HC RX W HCPCS: Performed by: EMERGENCY MEDICINE

## 2024-09-12 PROCEDURE — 81003 URINALYSIS AUTO W/O SCOPE: CPT

## 2024-09-12 PROCEDURE — 85025 COMPLETE CBC W/AUTO DIFF WBC: CPT

## 2024-09-12 RX ORDER — ONDANSETRON 4 MG/1
4 TABLET, ORALLY DISINTEGRATING ORAL 3 TIMES DAILY PRN
Qty: 21 TABLET | Refills: 0 | Status: SHIPPED | OUTPATIENT
Start: 2024-09-12

## 2024-09-12 RX ORDER — OXYCODONE AND ACETAMINOPHEN 5; 325 MG/1; MG/1
1 TABLET ORAL EVERY 6 HOURS PRN
Qty: 20 TABLET | Refills: 0 | Status: SHIPPED | OUTPATIENT
Start: 2024-09-12 | End: 2024-09-17

## 2024-09-12 RX ORDER — FAMOTIDINE 20 MG/1
20 TABLET, FILM COATED ORAL 2 TIMES DAILY
Qty: 60 TABLET | Refills: 0 | Status: SHIPPED | OUTPATIENT
Start: 2024-09-12

## 2024-09-12 RX ORDER — ONDANSETRON 2 MG/ML
4 INJECTION INTRAMUSCULAR; INTRAVENOUS ONCE
Status: COMPLETED | OUTPATIENT
Start: 2024-09-12 | End: 2024-09-12

## 2024-09-12 RX ORDER — FENTANYL CITRATE 0.05 MG/ML
50 INJECTION, SOLUTION INTRAMUSCULAR; INTRAVENOUS ONCE
Status: COMPLETED | OUTPATIENT
Start: 2024-09-12 | End: 2024-09-12

## 2024-09-12 RX ADMIN — FENTANYL CITRATE 50 MCG: 0.05 INJECTION, SOLUTION INTRAMUSCULAR; INTRAVENOUS at 15:31

## 2024-09-12 RX ADMIN — LIDOCAINE HYDROCHLORIDE: 20 SOLUTION ORAL at 15:31

## 2024-09-12 RX ADMIN — FAMOTIDINE 20 MG: 10 INJECTION, SOLUTION INTRAVENOUS at 15:32

## 2024-09-12 RX ADMIN — ONDANSETRON 4 MG: 2 INJECTION, SOLUTION INTRAMUSCULAR; INTRAVENOUS at 15:35

## 2024-09-12 ASSESSMENT — PAIN SCALES - GENERAL
PAINLEVEL_OUTOF10: 3
PAINLEVEL_OUTOF10: 8
PAINLEVEL_OUTOF10: 8

## 2024-09-12 ASSESSMENT — PAIN DESCRIPTION - FREQUENCY: FREQUENCY: CONTINUOUS

## 2024-09-12 ASSESSMENT — PAIN - FUNCTIONAL ASSESSMENT: PAIN_FUNCTIONAL_ASSESSMENT: 0-10

## 2024-09-12 ASSESSMENT — PAIN DESCRIPTION - DESCRIPTORS: DESCRIPTORS: BURNING;SHARP

## 2024-09-12 ASSESSMENT — PAIN DESCRIPTION - ORIENTATION: ORIENTATION: MID

## 2024-09-12 ASSESSMENT — PAIN DESCRIPTION - PAIN TYPE: TYPE: ACUTE PAIN

## 2024-09-12 ASSESSMENT — PAIN DESCRIPTION - LOCATION: LOCATION: ABDOMEN

## 2024-09-13 ENCOUNTER — TELEPHONE (OUTPATIENT)
Dept: BARIATRICS/WEIGHT MGMT | Age: 59
End: 2024-09-13

## 2024-09-15 ENCOUNTER — HOSPITAL ENCOUNTER (EMERGENCY)
Age: 59
Discharge: HOME | End: 2024-09-15
Attending: EMERGENCY MEDICINE
Payer: COMMERCIAL

## 2024-09-15 VITALS
OXYGEN SATURATION: 100 % | SYSTOLIC BLOOD PRESSURE: 125 MMHG | HEART RATE: 61 BPM | RESPIRATION RATE: 18 BRPM | WEIGHT: 180 LBS | DIASTOLIC BLOOD PRESSURE: 71 MMHG | TEMPERATURE: 97.9 F | BODY MASS INDEX: 28.19 KG/M2

## 2024-09-15 DIAGNOSIS — R10.13 ABDOMINAL PAIN, EPIGASTRIC: Primary | ICD-10-CM

## 2024-09-15 LAB
ALBUMIN SERPL-MCNC: 4.2 G/DL (ref 3.5–5.2)
ALP SERPL-CCNC: 84 U/L (ref 35–104)
ALT SERPL-CCNC: 17 U/L (ref 5–33)
ANION GAP SERPL CALCULATED.3IONS-SCNC: 13 MMOL/L (ref 9–17)
AST SERPL-CCNC: 21 U/L
BASOPHILS # BLD: 0.03 K/UL (ref 0–0.2)
BASOPHILS NFR BLD: 1 % (ref 0–2)
BILIRUB DIRECT SERPL-MCNC: 0.2 MG/DL
BILIRUB INDIRECT SERPL-MCNC: 0.5 MG/DL (ref 0–1)
BILIRUB SERPL-MCNC: 0.7 MG/DL (ref 0.3–1.2)
BUN SERPL-MCNC: 15 MG/DL (ref 6–20)
BUN/CREAT SERPL: 19 (ref 9–20)
CALCIUM SERPL-MCNC: 9.1 MG/DL (ref 8.6–10.4)
CHLORIDE SERPL-SCNC: 92 MMOL/L (ref 98–107)
CO2 SERPL-SCNC: 26 MMOL/L (ref 20–31)
CREAT SERPL-MCNC: 0.8 MG/DL (ref 0.5–0.9)
EOSINOPHIL # BLD: 0.14 K/UL (ref 0–0.44)
EOSINOPHILS RELATIVE PERCENT: 4 % (ref 1–4)
ERYTHROCYTE [DISTWIDTH] IN BLOOD BY AUTOMATED COUNT: 11.8 % (ref 11.8–14.4)
GFR, ESTIMATED: 85 ML/MIN/1.73M2
GLUCOSE SERPL-MCNC: 82 MG/DL (ref 70–99)
HCT VFR BLD AUTO: 37 % (ref 36.3–47.1)
HGB BLD-MCNC: 12.9 G/DL (ref 11.9–15.1)
IMM GRANULOCYTES # BLD AUTO: 0.01 K/UL (ref 0–0.3)
IMM GRANULOCYTES NFR BLD: 0 %
LIPASE SERPL-CCNC: 60 U/L (ref 13–60)
LYMPHOCYTES NFR BLD: 1.1 K/UL (ref 1.1–3.7)
LYMPHOCYTES RELATIVE PERCENT: 30 % (ref 24–43)
MCH RBC QN AUTO: 31.5 PG (ref 25.2–33.5)
MCHC RBC AUTO-ENTMCNC: 34.9 G/DL (ref 28.4–34.8)
MCV RBC AUTO: 90.5 FL (ref 82.6–102.9)
MONOCYTES NFR BLD: 0.27 K/UL (ref 0.1–1.2)
MONOCYTES NFR BLD: 7 % (ref 3–12)
NEUTROPHILS NFR BLD: 58 % (ref 36–65)
NEUTS SEG NFR BLD: 2.18 K/UL (ref 1.5–8.1)
NRBC BLD-RTO: 0 PER 100 WBC
PLATELET # BLD AUTO: 167 K/UL (ref 138–453)
PMV BLD AUTO: 9.6 FL (ref 8.1–13.5)
POTASSIUM SERPL-SCNC: 3.5 MMOL/L (ref 3.7–5.3)
PROT SERPL-MCNC: 6.4 G/DL (ref 6.4–8.3)
RBC # BLD AUTO: 4.09 M/UL (ref 3.95–5.11)
SODIUM SERPL-SCNC: 131 MMOL/L (ref 135–144)
WBC OTHER # BLD: 3.7 K/UL (ref 3.5–11.3)

## 2024-09-15 PROCEDURE — 6360000002 HC RX W HCPCS: Performed by: EMERGENCY MEDICINE

## 2024-09-15 PROCEDURE — 96375 TX/PRO/DX INJ NEW DRUG ADDON: CPT

## 2024-09-15 PROCEDURE — 83690 ASSAY OF LIPASE: CPT

## 2024-09-15 PROCEDURE — 80048 BASIC METABOLIC PNL TOTAL CA: CPT

## 2024-09-15 PROCEDURE — 85025 COMPLETE CBC W/AUTO DIFF WBC: CPT

## 2024-09-15 PROCEDURE — 99284 EMERGENCY DEPT VISIT MOD MDM: CPT

## 2024-09-15 PROCEDURE — 2500000003 HC RX 250 WO HCPCS: Performed by: EMERGENCY MEDICINE

## 2024-09-15 PROCEDURE — 96374 THER/PROPH/DIAG INJ IV PUSH: CPT

## 2024-09-15 PROCEDURE — 6370000000 HC RX 637 (ALT 250 FOR IP): Performed by: EMERGENCY MEDICINE

## 2024-09-15 PROCEDURE — 2580000003 HC RX 258: Performed by: EMERGENCY MEDICINE

## 2024-09-15 PROCEDURE — 80076 HEPATIC FUNCTION PANEL: CPT

## 2024-09-15 RX ORDER — OXYCODONE AND ACETAMINOPHEN 5; 325 MG/1; MG/1
1 TABLET ORAL EVERY 6 HOURS PRN
Qty: 6 TABLET | Refills: 0 | Status: SHIPPED | OUTPATIENT
Start: 2024-09-15 | End: 2024-09-18

## 2024-09-15 RX ORDER — ONDANSETRON 2 MG/ML
4 INJECTION INTRAMUSCULAR; INTRAVENOUS ONCE
Status: COMPLETED | OUTPATIENT
Start: 2024-09-15 | End: 2024-09-15

## 2024-09-15 RX ORDER — MORPHINE SULFATE 4 MG/ML
4 INJECTION, SOLUTION INTRAMUSCULAR; INTRAVENOUS ONCE
Status: COMPLETED | OUTPATIENT
Start: 2024-09-15 | End: 2024-09-15

## 2024-09-15 RX ADMIN — ONDANSETRON 4 MG: 2 INJECTION INTRAMUSCULAR; INTRAVENOUS at 19:23

## 2024-09-15 RX ADMIN — FAMOTIDINE 20 MG: 10 INJECTION, SOLUTION INTRAVENOUS at 19:23

## 2024-09-15 RX ADMIN — MORPHINE SULFATE 4 MG: 4 INJECTION, SOLUTION INTRAMUSCULAR; INTRAVENOUS at 19:23

## 2024-09-15 RX ADMIN — ALUMINUM HYDROXIDE, MAGNESIUM HYDROXIDE, AND SIMETHICONE: 1200; 120; 1200 SUSPENSION ORAL at 19:37

## 2024-09-15 ASSESSMENT — PAIN SCALES - GENERAL: PAINLEVEL_OUTOF10: 9

## 2024-09-25 ENCOUNTER — OFFICE VISIT (OUTPATIENT)
Dept: BARIATRICS/WEIGHT MGMT | Age: 59
End: 2024-09-25
Payer: COMMERCIAL

## 2024-09-25 ENCOUNTER — HOSPITAL ENCOUNTER (OUTPATIENT)
Age: 59
Setting detail: SPECIMEN
Discharge: HOME | End: 2024-09-25

## 2024-09-25 VITALS
DIASTOLIC BLOOD PRESSURE: 72 MMHG | OXYGEN SATURATION: 100 % | SYSTOLIC BLOOD PRESSURE: 120 MMHG | HEART RATE: 70 BPM | BODY MASS INDEX: 28.41 KG/M2 | HEIGHT: 67 IN | WEIGHT: 181 LBS

## 2024-09-25 DIAGNOSIS — R10.13 EPIGASTRIC PAIN: ICD-10-CM

## 2024-09-25 DIAGNOSIS — R10.13 EPIGASTRIC PAIN: Primary | ICD-10-CM

## 2024-09-25 LAB
ALBUMIN SERPL-MCNC: 4.5 G/DL (ref 3.5–5.2)
ALBUMIN/GLOB SERPL: 2 {RATIO} (ref 1–2.5)
ALP SERPL-CCNC: 79 U/L (ref 35–104)
ALT SERPL-CCNC: 22 U/L (ref 10–35)
ANION GAP SERPL CALCULATED.3IONS-SCNC: 10 MMOL/L (ref 9–16)
AST SERPL-CCNC: 27 U/L (ref 10–35)
BILIRUB SERPL-MCNC: 0.7 MG/DL (ref 0–1.2)
BUN SERPL-MCNC: 16 MG/DL (ref 6–20)
CALCIUM SERPL-MCNC: 9.9 MG/DL (ref 8.6–10.4)
CHLORIDE SERPL-SCNC: 101 MMOL/L (ref 98–107)
CO2 SERPL-SCNC: 28 MMOL/L (ref 20–31)
CREAT SERPL-MCNC: 1 MG/DL (ref 0.5–0.9)
ERYTHROCYTE [DISTWIDTH] IN BLOOD BY AUTOMATED COUNT: 12.2 % (ref 11.8–14.4)
GFR, ESTIMATED: 68 ML/MIN/1.73M2
GLUCOSE SERPL-MCNC: 101 MG/DL (ref 74–99)
HCT VFR BLD AUTO: 42.6 % (ref 36.3–47.1)
HGB BLD-MCNC: 13.9 G/DL (ref 11.9–15.1)
LIPASE SERPL-CCNC: 31 U/L (ref 13–60)
MCH RBC QN AUTO: 31 PG (ref 25.2–33.5)
MCHC RBC AUTO-ENTMCNC: 32.6 G/DL (ref 28.4–34.8)
MCV RBC AUTO: 95.1 FL (ref 82.6–102.9)
NRBC BLD-RTO: 0 PER 100 WBC
PLATELET # BLD AUTO: 162 K/UL (ref 138–453)
PMV BLD AUTO: 9.8 FL (ref 8.1–13.5)
POTASSIUM SERPL-SCNC: 4.1 MMOL/L (ref 3.7–5.3)
PROT SERPL-MCNC: 6.8 G/DL (ref 6.6–8.7)
RBC # BLD AUTO: 4.48 M/UL (ref 3.95–5.11)
SODIUM SERPL-SCNC: 139 MMOL/L (ref 136–145)
WBC OTHER # BLD: 3.5 K/UL (ref 3.5–11.3)

## 2024-09-25 PROCEDURE — 99213 OFFICE O/P EST LOW 20 MIN: CPT | Performed by: SURGERY

## 2024-09-25 NOTE — PROGRESS NOTES
[facility] INVASIVE BARIATRIC SURG  [address]  [address]  [place] [id number]  Dept: [phone]    ROBOTIC AND MINIMALLY INVASIVE SURGERY  PROGRESS NOTE      Patient: Reta Flores        Service Date: 9/25/2024      HPI:     Chief Complaint   Patient presents with    Abdominal Pain     History of GJU has been on medication for acid reflux. Also on Carafate and none of it seems to be helping at this point, feels it is getting worse.        History: 59 y.o. female who presents today for abdominal pain and post op bariatric surgery follow up. Today, patient states her stomach has worsened. New onset of burning in the throat. At the most recent ER visit 9/15/24, she states she did not receive any imaging. States lidocaine viscous hcl (XYLOCAINE) 5 mL (GI COCKTAIL) given was very helpful. She continues to take Protonix 40 mg QD.  She had prior CT's that showed improvement of gastritis and she returns for follow up.  At last EGD no abnormal findings were noted and she was to call if recurring pain.    Patient reports regular bowel movements. She denies nausea, vomiting, fever, and chills. She endorses intermittent nocturnal hyperhidrosis for a couple months.     She underwent a laparoscopic gastric bypass in 2002 with Dr. Samuels in [place]. She has not had long term follow up with bariatric surgery. Her highest weight was 270 lbs and her lowest weight was 158. She also has a history of a cholecystectomy in 2002. The patient denies  a history of myocardial infarction, deep vein thrombosis, pulmonary embolism, renal failure, hepatic failure, and stroke.    Prior Imaging/Testing:  Patient had a CT of the abdomen and pelvis on 4/8/24, which showed:  FINDINGS:  Lower Chest: No acute process at the base of the lungs.  Evidence of coronary  artery calcifications.     No hiatal hernia.  No pneumoperitoneum.     Organs: Evidence of previous cholecystectomy.  Mild 
36.3

## 2024-09-27 ENCOUNTER — TELEPHONE (OUTPATIENT)
Dept: BARIATRICS/WEIGHT MGMT | Age: 59
End: 2024-09-27

## 2024-10-02 ENCOUNTER — HOSPITAL ENCOUNTER (OUTPATIENT)
Dept: CT IMAGING | Age: 59
Discharge: HOME | End: 2024-10-04
Attending: SURGERY
Payer: COMMERCIAL

## 2024-10-02 DIAGNOSIS — R10.13 EPIGASTRIC PAIN: ICD-10-CM

## 2024-10-02 LAB
CREAT SERPL-MCNC: 1 MG/DL (ref 0.5–0.9)
GFR, ESTIMATED: 65 ML/MIN/1.73M2

## 2024-10-02 PROCEDURE — 2500000003 HC RX 250 WO HCPCS: Performed by: SURGERY

## 2024-10-02 PROCEDURE — 74177 CT ABD & PELVIS W/CONTRAST: CPT

## 2024-10-02 PROCEDURE — 2580000003 HC RX 258: Performed by: SURGERY

## 2024-10-02 PROCEDURE — 82565 ASSAY OF CREATININE: CPT

## 2024-10-02 PROCEDURE — 6360000004 HC RX CONTRAST MEDICATION: Performed by: SURGERY

## 2024-10-02 PROCEDURE — 36415 COLL VENOUS BLD VENIPUNCTURE: CPT

## 2024-10-02 RX ORDER — 0.9 % SODIUM CHLORIDE 0.9 %
80 INTRAVENOUS SOLUTION INTRAVENOUS ONCE
Status: COMPLETED | OUTPATIENT
Start: 2024-10-02 | End: 2024-10-02

## 2024-10-02 RX ORDER — IOPAMIDOL 755 MG/ML
75 INJECTION, SOLUTION INTRAVASCULAR
Status: COMPLETED | OUTPATIENT
Start: 2024-10-02 | End: 2024-10-02

## 2024-10-02 RX ORDER — SODIUM CHLORIDE 0.9 % (FLUSH) 0.9 %
10 SYRINGE (ML) INJECTION PRN
Status: DISCONTINUED | OUTPATIENT
Start: 2024-10-02 | End: 2024-10-05 | Stop reason: HOSPADM

## 2024-10-02 RX ADMIN — SODIUM CHLORIDE 80 ML: 0.9 INJECTION, SOLUTION INTRAVENOUS at 08:05

## 2024-10-02 RX ADMIN — IOPAMIDOL 75 ML: 755 INJECTION, SOLUTION INTRAVENOUS at 08:04

## 2024-10-02 RX ADMIN — BARIUM SULFATE 450 ML: 20 SUSPENSION ORAL at 08:05

## 2024-10-02 RX ADMIN — SODIUM CHLORIDE, PRESERVATIVE FREE 10 ML: 5 INJECTION INTRAVENOUS at 08:05

## 2024-10-02 NOTE — PROGRESS NOTES
PAT phone call for EGD completed with pt.    Date/time/location (entrance C) of surgery/procedure verified with pt.    NPO after MN status verified with pt.    Need for  verified with pt.    Instructed pt to take a shower the AM of surgery/procedure and to avoid lotions, creams, jewelry.    Instructed pt to take BP meds with a small sip of water prior to procedure/surgery.

## 2024-10-03 ENCOUNTER — ANESTHESIA EVENT (OUTPATIENT)
Dept: OPERATING ROOM | Age: 59
End: 2024-10-03
Payer: COMMERCIAL

## 2024-10-04 ENCOUNTER — HOSPITAL ENCOUNTER (OUTPATIENT)
Age: 59
Setting detail: OUTPATIENT SURGERY
Discharge: HOME | End: 2024-10-04
Attending: SURGERY | Admitting: SURGERY
Payer: COMMERCIAL

## 2024-10-04 ENCOUNTER — ANESTHESIA (OUTPATIENT)
Dept: OPERATING ROOM | Age: 59
End: 2024-10-04
Payer: COMMERCIAL

## 2024-10-04 VITALS
SYSTOLIC BLOOD PRESSURE: 98 MMHG | HEART RATE: 67 BPM | RESPIRATION RATE: 19 BRPM | OXYGEN SATURATION: 100 % | BODY MASS INDEX: 29.25 KG/M2 | HEIGHT: 66 IN | TEMPERATURE: 97.3 F | WEIGHT: 182 LBS | DIASTOLIC BLOOD PRESSURE: 63 MMHG

## 2024-10-04 PROCEDURE — 2709999900 HC NON-CHARGEABLE SUPPLY: Performed by: SURGERY

## 2024-10-04 PROCEDURE — 7100000011 HC PHASE II RECOVERY - ADDTL 15 MIN: Performed by: SURGERY

## 2024-10-04 PROCEDURE — 3700000000 HC ANESTHESIA ATTENDED CARE: Performed by: SURGERY

## 2024-10-04 PROCEDURE — 3609017100 HC EGD: Performed by: SURGERY

## 2024-10-04 PROCEDURE — 6360000002 HC RX W HCPCS: Performed by: NURSE ANESTHETIST, CERTIFIED REGISTERED

## 2024-10-04 PROCEDURE — 7100000010 HC PHASE II RECOVERY - FIRST 15 MIN: Performed by: SURGERY

## 2024-10-04 PROCEDURE — 2500000003 HC RX 250 WO HCPCS: Performed by: NURSE ANESTHETIST, CERTIFIED REGISTERED

## 2024-10-04 PROCEDURE — 2580000003 HC RX 258: Performed by: STUDENT IN AN ORGANIZED HEALTH CARE EDUCATION/TRAINING PROGRAM

## 2024-10-04 RX ORDER — SODIUM CHLORIDE 0.9 % (FLUSH) 0.9 %
5-40 SYRINGE (ML) INJECTION EVERY 12 HOURS SCHEDULED
Status: DISCONTINUED | OUTPATIENT
Start: 2024-10-04 | End: 2024-10-04 | Stop reason: HOSPADM

## 2024-10-04 RX ORDER — OXYCODONE HYDROCHLORIDE 5 MG/1
10 TABLET ORAL PRN
Status: DISCONTINUED | OUTPATIENT
Start: 2024-10-04 | End: 2024-10-04 | Stop reason: HOSPADM

## 2024-10-04 RX ORDER — HYDRALAZINE HYDROCHLORIDE 20 MG/ML
10 INJECTION INTRAMUSCULAR; INTRAVENOUS
Status: DISCONTINUED | OUTPATIENT
Start: 2024-10-04 | End: 2024-10-04 | Stop reason: HOSPADM

## 2024-10-04 RX ORDER — PROPOFOL 10 MG/ML
INJECTION, EMULSION INTRAVENOUS
Status: DISCONTINUED | OUTPATIENT
Start: 2024-10-04 | End: 2024-10-04 | Stop reason: SDUPTHER

## 2024-10-04 RX ORDER — SODIUM CHLORIDE 9 MG/ML
INJECTION, SOLUTION INTRAVENOUS PRN
Status: DISCONTINUED | OUTPATIENT
Start: 2024-10-04 | End: 2024-10-04 | Stop reason: HOSPADM

## 2024-10-04 RX ORDER — OXYCODONE HYDROCHLORIDE 5 MG/1
5 TABLET ORAL PRN
Status: DISCONTINUED | OUTPATIENT
Start: 2024-10-04 | End: 2024-10-04 | Stop reason: HOSPADM

## 2024-10-04 RX ORDER — NALOXONE HYDROCHLORIDE 0.4 MG/ML
INJECTION, SOLUTION INTRAMUSCULAR; INTRAVENOUS; SUBCUTANEOUS PRN
Status: DISCONTINUED | OUTPATIENT
Start: 2024-10-04 | End: 2024-10-04 | Stop reason: HOSPADM

## 2024-10-04 RX ORDER — LABETALOL HYDROCHLORIDE 5 MG/ML
10 INJECTION, SOLUTION INTRAVENOUS
Status: DISCONTINUED | OUTPATIENT
Start: 2024-10-04 | End: 2024-10-04 | Stop reason: HOSPADM

## 2024-10-04 RX ORDER — MEPERIDINE HYDROCHLORIDE 50 MG/ML
12.5 INJECTION INTRAMUSCULAR; INTRAVENOUS; SUBCUTANEOUS ONCE
Status: DISCONTINUED | OUTPATIENT
Start: 2024-10-04 | End: 2024-10-04 | Stop reason: HOSPADM

## 2024-10-04 RX ORDER — MORPHINE SULFATE 2 MG/ML
1 INJECTION, SOLUTION INTRAMUSCULAR; INTRAVENOUS EVERY 5 MIN PRN
Status: DISCONTINUED | OUTPATIENT
Start: 2024-10-04 | End: 2024-10-04 | Stop reason: HOSPADM

## 2024-10-04 RX ORDER — DIPHENHYDRAMINE HYDROCHLORIDE 50 MG/ML
12.5 INJECTION INTRAMUSCULAR; INTRAVENOUS
Status: DISCONTINUED | OUTPATIENT
Start: 2024-10-04 | End: 2024-10-04 | Stop reason: HOSPADM

## 2024-10-04 RX ORDER — SODIUM CHLORIDE, SODIUM LACTATE, POTASSIUM CHLORIDE, CALCIUM CHLORIDE 600; 310; 30; 20 MG/100ML; MG/100ML; MG/100ML; MG/100ML
INJECTION, SOLUTION INTRAVENOUS CONTINUOUS
Status: DISCONTINUED | OUTPATIENT
Start: 2024-10-04 | End: 2024-10-04 | Stop reason: HOSPADM

## 2024-10-04 RX ORDER — LIDOCAINE HYDROCHLORIDE 10 MG/ML
1 INJECTION, SOLUTION EPIDURAL; INFILTRATION; INTRACAUDAL; PERINEURAL
Status: DISCONTINUED | OUTPATIENT
Start: 2024-10-04 | End: 2024-10-04 | Stop reason: HOSPADM

## 2024-10-04 RX ORDER — ONDANSETRON 2 MG/ML
4 INJECTION INTRAMUSCULAR; INTRAVENOUS
Status: DISCONTINUED | OUTPATIENT
Start: 2024-10-04 | End: 2024-10-04 | Stop reason: HOSPADM

## 2024-10-04 RX ORDER — SODIUM CHLORIDE 0.9 % (FLUSH) 0.9 %
5-40 SYRINGE (ML) INJECTION PRN
Status: DISCONTINUED | OUTPATIENT
Start: 2024-10-04 | End: 2024-10-04 | Stop reason: HOSPADM

## 2024-10-04 RX ORDER — LIDOCAINE HYDROCHLORIDE 10 MG/ML
INJECTION, SOLUTION EPIDURAL; INFILTRATION; INTRACAUDAL; PERINEURAL
Status: DISCONTINUED | OUTPATIENT
Start: 2024-10-04 | End: 2024-10-04 | Stop reason: SDUPTHER

## 2024-10-04 RX ORDER — MIDAZOLAM HYDROCHLORIDE 2 MG/2ML
2 INJECTION, SOLUTION INTRAMUSCULAR; INTRAVENOUS
Status: DISCONTINUED | OUTPATIENT
Start: 2024-10-04 | End: 2024-10-04 | Stop reason: HOSPADM

## 2024-10-04 RX ORDER — METOCLOPRAMIDE HYDROCHLORIDE 5 MG/ML
10 INJECTION INTRAMUSCULAR; INTRAVENOUS
Status: DISCONTINUED | OUTPATIENT
Start: 2024-10-04 | End: 2024-10-04 | Stop reason: HOSPADM

## 2024-10-04 RX ADMIN — SODIUM CHLORIDE, POTASSIUM CHLORIDE, SODIUM LACTATE AND CALCIUM CHLORIDE: 600; 310; 30; 20 INJECTION, SOLUTION INTRAVENOUS at 08:40

## 2024-10-04 RX ADMIN — PROPOFOL 50 MG: 10 INJECTION, EMULSION INTRAVENOUS at 09:56

## 2024-10-04 RX ADMIN — PROPOFOL 50 MG: 10 INJECTION, EMULSION INTRAVENOUS at 09:57

## 2024-10-04 RX ADMIN — PROPOFOL 100 MG: 10 INJECTION, EMULSION INTRAVENOUS at 09:55

## 2024-10-04 RX ADMIN — LIDOCAINE HYDROCHLORIDE 100 MG: 10 INJECTION, SOLUTION EPIDURAL; INFILTRATION; INTRACAUDAL; PERINEURAL at 09:54

## 2024-10-04 RX ADMIN — PROPOFOL 20 MG: 10 INJECTION, EMULSION INTRAVENOUS at 09:59

## 2024-10-04 NOTE — H&P
Subjective     The patient is a 59 y.o. female who is here to undergo EGD for dyspepsia.  States she has no pain today       Past Medical History:   Diagnosis Date    Abdominal pain     COVID-19     x2    COVID-19 vaccine administered     Depression     Eczema     Environmental allergies     dust mites,etc    Gastrojejunal ulcer     Hyperlipidemia     Hypertension     Kidney stones     Nausea & vomiting     Under care of team     pcp dr whalen    Wears glasses    .    Review of Systems - A complete 14 point review of systems was performed.  All was negative unless otherwise documented in HPI.    Allergies:  Allergies   Allergen Reactions    Ibuprofen      Gastric bypass    Amoxicillin     Ciprofloxacin        Past Surgical History:  Past Surgical History:   Procedure Laterality Date    BREAST SURGERY Bilateral     augmentation    CHOLECYSTECTOMY      2002    ESOPHAGOGASTRODUODENOSCOPY  04/15/2024    GASTRIC BYPASS SURGERY      2002 in [place]    OVARIAN CYST REMOVAL      RHINOPLASTY      TYMPANOPLASTY      UPPER GASTROINTESTINAL ENDOSCOPY N/A 4/15/2024    ESOPHAGOGASTRODUODENOSCOPY - GI SCHEDULED performed by Iftikhar Erickson DO at [facility] OR       Family History:  History reviewed. No pertinent family history.    Social History:  Social History     Socioeconomic History    Marital status:      Spouse name: Not on file    Number of children: Not on file    Years of education: Not on file    Highest education level: Not on file   Occupational History    Not on file   Tobacco Use    Smoking status: Former     Average packs/day: 0.5 packs/day for 12.0 years (6.0 ttl pk-yrs)     Types: Cigarettes     Start date: 1/1/1988    Smokeless tobacco: Never   Vaping Use    Vaping status: Never Used   Substance and Sexual Activity    Alcohol use: No    Drug use: No    Sexual activity: Not on file   Other Topics Concern    Not on file   Social History Narrative    Not on file     Social Determinants of Health  rhythm, S1 normal and S2 normal.    Pulmonary/Chest: Effort normal and breath sounds normal.   Abdominal: Soft. Normal appearance. There is no organomegaly. No tenderness. There is no rigidity, no rebound, no guarding and no Moscoso's sign.   Musculoskeletal:        Right lower leg: Normal. No tenderness and no edema.        Left lower leg: Normal. No tenderness and no edema.   Lymphadenopathy:     No cervical adenopathy, No Exrtemity Adenopathy.   Neurological: The patient is alert and oriented.   Skin: Skin is warm, dry and intact.   Psychiatric: The patient has a normal mood and affect. Speech is normal and behavior is normal. Judgment and thought content normal. Cognition and memory are normal.     Assessment     Patient Active Problem List   Diagnosis    Acute gastric ulcer without hemorrhage or perforation       Plan   EGD

## 2024-10-04 NOTE — ANESTHESIA POSTPROCEDURE EVALUATION
Department of Anesthesiology  Postprocedure Note    Patient: Reta Flores  MRN: [id number]  YOB: 1965  Date of evaluation: 10/4/2024    Procedure Summary       Date: 10/04/24 Room / Location: [facility] PROCEDURE ROOM / [facility]    Anesthesia Start: 0950 Anesthesia Stop: 1008    Procedure: ESOPHAGOGASTRODUODENOSCOPY WITH FOREIGN BODY REMOVAL Diagnosis:       Epigastric pain      (Epigastric pain [R10.13])    Surgeons: Iftikhar Erickson DO Responsible Provider: Misty Redding MD    Anesthesia Type: MAC ASA Status: 2            Anesthesia Type: No value filed.    Maldonado Phase I: Maldonado Score: 10    Maldonado Phase II: Maldonado Score: 10    Anesthesia Post Evaluation    Patient location during evaluation: PACU  Patient participation: complete - patient participated  Level of consciousness: awake and alert  Airway patency: patent  Nausea & Vomiting: no nausea and no vomiting  Cardiovascular status: blood pressure returned to baseline  Respiratory status: acceptable and room air  Hydration status: euvolemic  Pain management: adequate and satisfactory to patient    No notable events documented.

## 2024-10-04 NOTE — ANESTHESIA PRE PROCEDURE
Department of Anesthesiology  Preprocedure Note       Name:  Reta Flores   Age:  59 y.o.  :  1965                                          MRN:  [id number]         Date:  10/4/2024      Surgeon: Surgeon(s):  Iftikhar Erickson DO    Procedure: Procedure(s):  ESOPHAGOGASTRODUODENOSCOPY    Medications prior to admission:   Prior to Admission medications    Medication Sig Start Date End Date Taking? Authorizing Provider   famotidine (PEPCID) 20 MG tablet Take 1 tablet by mouth 2 times daily 24   Payal Pitt MD   ondansetron (ZOFRAN-ODT) 4 MG disintegrating tablet Take 1 tablet by mouth 3 times daily as needed for Nausea or Vomiting 24   Payal Pitt MD   sucralfate (CARAFATE) 1 GM tablet Take 1 tablet by mouth in the morning, at noon, in the evening, and at bedtime 24   Maya Roque APRN - CNP   pantoprazole (PROTONIX) 40 MG tablet TAKE 1 TABLET BY MOUTH TWICE A DAY 4/15/24   Iftikhar Erickson DO   albuterol sulfate HFA (PROVENTIL;VENTOLIN;PROAIR) 108 (90 Base) MCG/ACT inhaler Inhale 2 puffs into the lungs every 6 hours as needed for Shortness of Breath 24   Saritha Doshi MD   Azelastine HCl 137 MCG/SPRAY SOLN 1 spray by Nasal route daily 24   Saritha Doshi MD   metoprolol succinate (TOPROL XL) 25 MG extended release tablet Take 0.5 tablets by mouth daily 22   Saritha Doshi MD   LATUDA 40 MG TABS tablet 2 tablets at bedtime 21   Saritha Doshi MD   atorvastatin (LIPITOR) 10 MG tablet TAKE 1 TABLET ONCE DAILY ORALLY ONCE A DAY 90 DAYS    Saritha Doshi MD   house dust mite allergen extract (ODACTRA) 12 SQ-HDM SUBL sublingual tablet PLEASE SEE ATTACHED FOR DETAILED DIRECTIONS 3/11/24   Saritha Doshi MD   clonazePAM (KLONOPIN) 1 MG tablet Take 1 tablet by mouth at bedtime. 3/4/24   Saritha Doshi MD   EPINEPHrine (EPIPEN) 0.3 MG/0.3ML SOAJ injection  24   Saritha Doshi MD   lamoTRIgine (LAMICTAL)

## 2024-10-04 NOTE — OP NOTE
[facility] OR  [address]  [facility] [address]  Dept: [phone]  Loc: [phone]    Preoperative Diagnosis:  Gastrojejunal Ulcer    Postoperative Diagnosis:   Epigastric pain  No Ulcer on endoscopy  Denver limb normal for 20 cm  4 cm pouch  No stomal dilation    Procedure: Esophagogastroduodenoscopy     Surgeon: Iftikhar Erickosn DO    Findings: As above    EBL: none    Anesthesia: MAC, See Anesthesia Records    Specimen:    none    Operative Narrative:  The risks and benefits of the procedure were explained to the patient in detail, including but not limited to: bleeding, infection, need for further surgery, damage to surrounding structures and perforation.  The patient consented with the procedure.    The patient was taken to the endoscopic suite and placed in lateral position.  Oxygen was administered via nasal cannula and a mouth guard placed.  MAC was administered via the anesthesia team.  The endoscope was then advanced into the oropharynx and passed into the esophagus under direct visualization.  The scope was further advanced under direct visualization into the esophageal lumen and down into the gastric pouch.   The scope was advanced past the anastomosis and the denver limb surveyed.  The denver limb appeared patent and without signs of ischemia.  The scope was withdrawn and the blind pouch surveyed, without lesion.  Hemostasis noted.  The denver limb and gastric pouch without trauma or bleeding. The esophagus without lesion    The patient tolerated the procedure well

## 2024-10-04 NOTE — DISCHARGE INSTRUCTIONS
POST-ENDOSCOPY INSTRUCTIONS    1. ACTIVITY   No driving, operating machinery, or making important decisions for 24 hours.    Resume normal activity after 24 hours.  You may return to work after 24 hours.    2. DIET    EGD: Resume your usual diet unless specified below.                Diet Modification: Regularr    3. MEDICATIONS  (Do not consume alcohol, tranquilizers, or sleeping medications for 24 hours unless advised by your physician)                 Resume your usual medications    4. PHYSICIAN FOLLOW-UP                 Please continue with your previously scheduled appointments                 See your primary care physician as planned.      6. NORMAL CHANGES YOU MAY EXPERIENCE AFTER ENDOSCOPY:      EGD:  Sore throat, some abdominal pain and cramping.            7. CALL YOUR PHYSICIAN IF YOU EXPERIENCE ANY OF THE FOLLOWING      A.  Passing blood rectally or vomiting blood (color may be red or black)      B.  Severe abdominal pain or tenderness (that is not relieved by passing air)      C.  Fever, chills, or excessive sweating      D.  Persistent nausea or vomiting      E.  Redness or swelling at the IV site    If you have additional questions, PLEASE call your doctor or the [facility] center at [phone]

## 2024-10-17 ENCOUNTER — HOSPITAL ENCOUNTER (OUTPATIENT)
Age: 59
Setting detail: SPECIMEN
Discharge: HOME | End: 2024-10-17

## 2024-10-17 ENCOUNTER — OFFICE VISIT (OUTPATIENT)
Dept: BARIATRICS/WEIGHT MGMT | Age: 59
End: 2024-10-17
Payer: COMMERCIAL

## 2024-10-17 VITALS
DIASTOLIC BLOOD PRESSURE: 82 MMHG | SYSTOLIC BLOOD PRESSURE: 122 MMHG | WEIGHT: 184 LBS | OXYGEN SATURATION: 98 % | HEART RATE: 78 BPM | HEIGHT: 66 IN | BODY MASS INDEX: 29.57 KG/M2

## 2024-10-17 DIAGNOSIS — R30.0 DYSURIA: ICD-10-CM

## 2024-10-17 DIAGNOSIS — R30.0 DYSURIA: Primary | ICD-10-CM

## 2024-10-17 DIAGNOSIS — Z98.84 S/P GASTRIC BYPASS: ICD-10-CM

## 2024-10-17 DIAGNOSIS — R10.13 EPIGASTRIC PAIN: ICD-10-CM

## 2024-10-17 LAB
BILIRUB UR QL STRIP: NEGATIVE
CLARITY UR: CLEAR
COLOR UR: YELLOW
EPI CELLS #/AREA URNS HPF: NORMAL /HPF (ref 0–5)
GLUCOSE UR STRIP-MCNC: NEGATIVE MG/DL
HGB UR QL STRIP.AUTO: NEGATIVE
KETONES UR STRIP-MCNC: NEGATIVE MG/DL
LEUKOCYTE ESTERASE UR QL STRIP: NEGATIVE
NITRITE UR QL STRIP: NEGATIVE
PH UR STRIP: 7 [PH] (ref 5–8)
PROT UR STRIP-MCNC: NEGATIVE MG/DL
RBC #/AREA URNS HPF: NORMAL /HPF (ref 0–2)
SP GR UR STRIP: 1.01 (ref 1–1.03)
UROBILINOGEN UR STRIP-ACNC: NORMAL EU/DL (ref 0–1)
WBC #/AREA URNS HPF: NORMAL /HPF (ref 0–5)

## 2024-10-17 PROCEDURE — 99213 OFFICE O/P EST LOW 20 MIN: CPT | Performed by: SURGERY

## 2024-10-17 RX ORDER — OMEGA-3/DHA/EPA/FISH OIL 60 MG-90MG
1 CAPSULE ORAL DAILY
COMMUNITY

## 2024-10-17 RX ORDER — TIRZEPATIDE 2.5 MG/.5ML
0.5 INJECTION, SOLUTION SUBCUTANEOUS WEEKLY
COMMUNITY
Start: 2024-10-10

## 2024-10-17 RX ORDER — CETIRIZINE HYDROCHLORIDE 10 MG/1
1 TABLET ORAL
COMMUNITY

## 2024-10-17 NOTE — PROGRESS NOTES
[facility] INVASIVE BARIATRIC SURG  [address]  [address]  [place] [id number]  Dept: [phone]    ROBOTIC AND MINIMALLY INVASIVE SURGERY  PROGRESS NOTE     Patient: Reta Flores        Service Date: 10/17/2024      HPI:     Chief Complaint   Patient presents with    Upper Abdominal Pain     Follow up CT and EGD results       History: 59 y.o. female who presents today for upper abdominal pain follow up for CT and EGD results. Patient is doing well today and notes no abdominal pain since her EGD. She is no longer taking carafate or any heart burn medication, she was previously on protonix and pepcid. She was placed on Zepbound for weight loss, will be starting it next week.     Patient reports regular bowel movements. She denies urinary symptoms, nausea, vomiting, fever, and chills. She is no longer having episodes of nocturnal hyperhidrosis.     She underwent a laparoscopic gastric bypass in 2002 with Dr. Samuels in [place]. She has not had long term follow up with bariatric surgery. Her highest weight was 270 lbs and her lowest weight was 158. She also has a history of a cholecystectomy in 2002. The patient denies  a history of myocardial infarction, deep vein thrombosis, pulmonary embolism, renal failure, hepatic failure, and stroke.    Prior Imaging/Testing:  Patient had an EGD on 10/4/24, which showed  No Ulcer on endoscopy  Denver limb normal for 20 cm  4 cm pouch  No stomal dilation    Patient had a CT of the abdomen and pelvis on 10/2/24, which showed  1. Solitary tiny focus of gas within the urinary bladder which otherwise  appears unremarkable. This is nonspecific and may be related to recent  instrumentation. Correlate with urinalysis to exclude cystitis.  2. Otherwise no acute intra-abdominal or intrapelvic abnormality.  3. Incidental findings as above.    Medical History:  Past Medical History:   Diagnosis Date    Abdominal pain     COVID-19

## 2024-10-20 PROBLEM — R10.10 PAIN OF UPPER ABDOMEN: Status: ACTIVE | Noted: 2024-10-20

## 2024-12-01 ENCOUNTER — HOSPITAL ENCOUNTER (EMERGENCY)
Age: 59
Discharge: HOME | End: 2024-12-01
Attending: EMERGENCY MEDICINE
Payer: COMMERCIAL

## 2024-12-01 VITALS
HEIGHT: 67 IN | WEIGHT: 170 LBS | BODY MASS INDEX: 26.68 KG/M2 | RESPIRATION RATE: 15 BRPM | DIASTOLIC BLOOD PRESSURE: 85 MMHG | SYSTOLIC BLOOD PRESSURE: 135 MMHG | HEART RATE: 71 BPM | OXYGEN SATURATION: 100 % | TEMPERATURE: 97.3 F

## 2024-12-01 DIAGNOSIS — M54.32 SCIATICA OF LEFT SIDE: Primary | ICD-10-CM

## 2024-12-01 PROCEDURE — 96372 THER/PROPH/DIAG INJ SC/IM: CPT

## 2024-12-01 PROCEDURE — 6360000002 HC RX W HCPCS: Performed by: NURSE PRACTITIONER

## 2024-12-01 PROCEDURE — 99284 EMERGENCY DEPT VISIT MOD MDM: CPT

## 2024-12-01 RX ORDER — METHOCARBAMOL 750 MG/1
750 TABLET, FILM COATED ORAL 3 TIMES DAILY
Qty: 15 TABLET | Refills: 0 | Status: SHIPPED | OUTPATIENT
Start: 2024-12-01 | End: 2024-12-06

## 2024-12-01 RX ORDER — PREDNISONE 20 MG/1
TABLET ORAL
Qty: 18 TABLET | Refills: 0 | Status: SHIPPED | OUTPATIENT
Start: 2024-12-01 | End: 2024-12-11

## 2024-12-01 RX ORDER — ORPHENADRINE CITRATE 30 MG/ML
60 INJECTION INTRAMUSCULAR; INTRAVENOUS ONCE
Status: COMPLETED | OUTPATIENT
Start: 2024-12-01 | End: 2024-12-01

## 2024-12-01 RX ORDER — DEXAMETHASONE SODIUM PHOSPHATE 10 MG/ML
10 INJECTION, SOLUTION INTRAMUSCULAR; INTRAVENOUS ONCE
Status: COMPLETED | OUTPATIENT
Start: 2024-12-01 | End: 2024-12-01

## 2024-12-01 RX ADMIN — DEXAMETHASONE SODIUM PHOSPHATE 10 MG: 10 INJECTION INTRAMUSCULAR; INTRAVENOUS at 18:14

## 2024-12-01 RX ADMIN — ORPHENADRINE CITRATE 60 MG: 60 INJECTION INTRAMUSCULAR; INTRAVENOUS at 18:14

## 2024-12-01 ASSESSMENT — PAIN - FUNCTIONAL ASSESSMENT: PAIN_FUNCTIONAL_ASSESSMENT: 0-10

## 2024-12-01 ASSESSMENT — PAIN SCALES - GENERAL
PAINLEVEL_OUTOF10: 9
PAINLEVEL_OUTOF10: 9

## 2024-12-01 ASSESSMENT — PAIN DESCRIPTION - LOCATION: LOCATION: BACK

## 2024-12-01 ASSESSMENT — PAIN DESCRIPTION - PAIN TYPE: TYPE: ACUTE PAIN

## 2024-12-01 ASSESSMENT — PAIN DESCRIPTION - ORIENTATION: ORIENTATION: LEFT

## 2024-12-01 NOTE — DISCHARGE INSTRUCTIONS
Rest, take medications as directed for pain.  Steroids until completed muscle relaxer as needed.  Please call your family doctor tomorrow for follow-up this week.

## 2024-12-01 NOTE — ED PROVIDER NOTES
[facility]  [address] [address]  Dept: [phone]  Loc: [phone]  eMERGENCYdEPARTMENT eNCOUnter      Pt Name: Reta Flores  MRN: [id number]  Birthdate 1965of evaluation: 12/1/2024  Provider:YADI MCCAULEY - CNP    CHIEF COMPLAINT       Chief Complaint   Patient presents with    Back Pain     Sharp pain going down her left leg       HISTORY OF PRESENT ILLNESS  (Location/Symptom, Timing/Onset, Context/Setting, Quality, Duration,Modifying Factors, Severity.)   Reta Flores is a 59 y.o. female who presents to the emergency department with sciatic nerve pain.  Patient reports pain began 6 days ago.  She has had pain in the left buttocks traveling down the left leg.  She has had sciatic problems in the past and has been trying to rest and use over-the-counter medications without improvement.  Denies injury.  No bowel or bladder incontinence.  No fever or chills.      Nursing Notes were reviewedand agreed with or any disagreements were addressed in the HPI.    REVIEW OF SYSTEMS    (2-9 systems for level 4, 10 or more for level 5)     Review of Systems    Except as noted above the remainder of the review of systems was reviewed and negative.       PAST MEDICAL HISTORY         Diagnosis Date    Abdominal pain     COVID-19     x2    COVID-19 vaccine administered     Depression     Eczema     Environmental allergies     dust mites,etc    Gastrojejunal ulcer     Hyperlipidemia     Hypertension     Kidney stones     Nausea & vomiting     Under care of team     pcp dr whalen    Wears glasses        SURGICAL HISTORY           Procedure Laterality Date    BREAST SURGERY Bilateral     augmentation    CHOLECYSTECTOMY      2002    ESOPHAGOGASTRODUODENOSCOPY  04/15/2024    GASTRIC BYPASS SURGERY      2002 in [place]    OVARIAN CYST REMOVAL      RHINOPLASTY      TYMPANOPLASTY      UPPER GASTROINTESTINAL ENDOSCOPY N/A 4/15/2024    ESOPHAGOGASTRODUODENOSCOPY - GI

## 2024-12-04 NOTE — ED PROVIDER NOTES
EMERGENCY DEPARTMENT ENCOUNTER   ATTENDING ATTESTATION   Pt Name: Reta Flores  MRN: [id number]  Birthdate 1965  Date of evaluation: 12/4/24   Reta Flores is a 59 y.o. female with CC: Back Pain (Sharp pain going down her left leg)    MDM:   I performed a substantive part of the MDM during the patient's E/M visit. I personally made or approved the documented management plan and acknowledge its risk of complications.           CRITICAL CARE:       EKG: All EKG's are interpreted by the Emergency Department Physician who either signs or Co-signs this chart in the absence of a cardiologist.      RADIOLOGY:All plain film, CT, MRI, and formal ultrasound images (except ED bedside ultrasound) are read by the radiologist, see reports below, unless otherwise noted in MDM or here.  No orders to display     LABS: All lab results were reviewed by myself, and all abnormals are listed below.  Labs Reviewed - No data to display  CONSULTS:  None  FINAL IMPRESSION      1. Sciatica of left side            PASTMEDICAL HISTORY     Past Medical History:   Diagnosis Date    Abdominal pain     COVID-19     x2    COVID-19 vaccine administered     Depression     Eczema     Environmental allergies     dust mites,etc    Gastrojejunal ulcer     Hyperlipidemia     Hypertension     Kidney stones     Nausea & vomiting     Under care of team     pcp dr whalen    Wears glasses      SURGICAL HISTORY       Past Surgical History:   Procedure Laterality Date    BREAST SURGERY Bilateral     augmentation    CHOLECYSTECTOMY      2002    ESOPHAGOGASTRODUODENOSCOPY  04/15/2024    GASTRIC BYPASS SURGERY      2002 in [place]    OVARIAN CYST REMOVAL      RHINOPLASTY      TYMPANOPLASTY      UPPER GASTROINTESTINAL ENDOSCOPY N/A 4/15/2024    ESOPHAGOGASTRODUODENOSCOPY - GI SCHEDULED performed by Iftikhar Erickson DO at [facility] OR    UPPER GASTROINTESTINAL ENDOSCOPY N/A 10/4/2024    ESOPHAGOGASTRODUODENOSCOPY WITH FOREIGN BODY REMOVAL performed by Iftikhar Erickson

## 2024-12-17 ENCOUNTER — HOSPITAL ENCOUNTER (EMERGENCY)
Age: 59
Discharge: HOME | End: 2024-12-17
Attending: EMERGENCY MEDICINE
Payer: COMMERCIAL

## 2024-12-17 VITALS
RESPIRATION RATE: 14 BRPM | WEIGHT: 170 LBS | TEMPERATURE: 98 F | HEART RATE: 87 BPM | OXYGEN SATURATION: 96 % | BODY MASS INDEX: 26.63 KG/M2 | DIASTOLIC BLOOD PRESSURE: 88 MMHG | SYSTOLIC BLOOD PRESSURE: 146 MMHG

## 2024-12-17 DIAGNOSIS — T78.40XA ALLERGIC REACTION, INITIAL ENCOUNTER: Primary | ICD-10-CM

## 2024-12-17 PROCEDURE — 96375 TX/PRO/DX INJ NEW DRUG ADDON: CPT

## 2024-12-17 PROCEDURE — 96374 THER/PROPH/DIAG INJ IV PUSH: CPT

## 2024-12-17 PROCEDURE — 2580000003 HC RX 258: Performed by: EMERGENCY MEDICINE

## 2024-12-17 PROCEDURE — 6360000002 HC RX W HCPCS

## 2024-12-17 PROCEDURE — 2500000003 HC RX 250 WO HCPCS: Performed by: EMERGENCY MEDICINE

## 2024-12-17 PROCEDURE — 96376 TX/PRO/DX INJ SAME DRUG ADON: CPT

## 2024-12-17 PROCEDURE — 99284 EMERGENCY DEPT VISIT MOD MDM: CPT

## 2024-12-17 PROCEDURE — 6360000002 HC RX W HCPCS: Performed by: EMERGENCY MEDICINE

## 2024-12-17 PROCEDURE — 96372 THER/PROPH/DIAG INJ SC/IM: CPT

## 2024-12-17 RX ORDER — DIPHENHYDRAMINE HCL 25 MG
25 TABLET ORAL ONCE
Status: DISCONTINUED | OUTPATIENT
Start: 2024-12-17 | End: 2024-12-17

## 2024-12-17 RX ORDER — EPINEPHRINE 1 MG/ML
0.5 INJECTION, SOLUTION INTRAMUSCULAR; SUBCUTANEOUS ONCE
Status: COMPLETED | OUTPATIENT
Start: 2024-12-17 | End: 2024-12-17

## 2024-12-17 RX ORDER — DIPHENHYDRAMINE HYDROCHLORIDE 50 MG/ML
25 INJECTION INTRAMUSCULAR; INTRAVENOUS ONCE
Status: COMPLETED | OUTPATIENT
Start: 2024-12-17 | End: 2024-12-17

## 2024-12-17 RX ORDER — EPINEPHRINE 1 MG/ML
INJECTION, SOLUTION INTRAMUSCULAR; SUBCUTANEOUS
Status: COMPLETED
Start: 2024-12-17 | End: 2024-12-17

## 2024-12-17 RX ORDER — FAMOTIDINE 20 MG/1
20 TABLET, FILM COATED ORAL ONCE
Status: DISCONTINUED | OUTPATIENT
Start: 2024-12-17 | End: 2024-12-17

## 2024-12-17 RX ORDER — FAMOTIDINE 20 MG/1
20 TABLET, FILM COATED ORAL 2 TIMES DAILY
Qty: 60 TABLET | Refills: 3 | Status: SHIPPED | OUTPATIENT
Start: 2024-12-17

## 2024-12-17 RX ORDER — 0.9 % SODIUM CHLORIDE 0.9 %
1000 INTRAVENOUS SOLUTION INTRAVENOUS ONCE
Status: COMPLETED | OUTPATIENT
Start: 2024-12-17 | End: 2024-12-17

## 2024-12-17 RX ORDER — PREDNISONE 50 MG/1
50 TABLET ORAL DAILY
Qty: 5 TABLET | Refills: 0 | Status: SHIPPED | OUTPATIENT
Start: 2024-12-17 | End: 2024-12-22

## 2024-12-17 RX ADMIN — EPINEPHRINE 0.5 MG: 1 INJECTION INTRAMUSCULAR; INTRAVENOUS; SUBCUTANEOUS at 16:34

## 2024-12-17 RX ADMIN — DIPHENHYDRAMINE HYDROCHLORIDE 25 MG: 50 INJECTION INTRAMUSCULAR; INTRAVENOUS at 19:26

## 2024-12-17 RX ADMIN — SODIUM CHLORIDE 1000 ML: 9 INJECTION, SOLUTION INTRAVENOUS at 14:47

## 2024-12-17 RX ADMIN — DIPHENHYDRAMINE HYDROCHLORIDE 25 MG: 50 INJECTION INTRAMUSCULAR; INTRAVENOUS at 16:40

## 2024-12-17 RX ADMIN — FAMOTIDINE 20 MG: 10 INJECTION, SOLUTION INTRAVENOUS at 14:48

## 2024-12-17 RX ADMIN — SODIUM CHLORIDE, PRESERVATIVE FREE 20 MG: 5 INJECTION INTRAVENOUS at 16:40

## 2024-12-17 RX ADMIN — FAMOTIDINE 20 MG: 10 INJECTION, SOLUTION INTRAVENOUS at 19:26

## 2024-12-17 RX ADMIN — DIPHENHYDRAMINE HYDROCHLORIDE 25 MG: 50 INJECTION INTRAMUSCULAR; INTRAVENOUS at 14:56

## 2024-12-17 RX ADMIN — EPINEPHRINE 0.5 MG: 1 INJECTION, SOLUTION INTRAMUSCULAR; SUBCUTANEOUS at 14:41

## 2024-12-17 RX ADMIN — EPINEPHRINE 0.5 MG: 1 INJECTION INTRAMUSCULAR; INTRAVENOUS; SUBCUTANEOUS at 14:41

## 2024-12-17 RX ADMIN — WATER 125 MG: 1 INJECTION INTRAMUSCULAR; INTRAVENOUS; SUBCUTANEOUS at 14:48

## 2024-12-17 ASSESSMENT — PAIN SCALES - GENERAL: PAINLEVEL_OUTOF10: 8

## 2024-12-17 ASSESSMENT — PAIN - FUNCTIONAL ASSESSMENT: PAIN_FUNCTIONAL_ASSESSMENT: 0-10

## 2024-12-17 NOTE — ED NOTES
Pt reports an improvement in her symptoms. Voice is no longer as hoarse, eye swelling has gone down and she feels like she can take a deep breath.

## 2024-12-17 NOTE — ED NOTES
Pt states bilateral eye swelling Horse Throat,Pt states she feels like throat is starting to swell Dominick ENGLE notified.Pt ambulated to restroom.

## 2024-12-17 NOTE — ED NOTES
Pt arrived with complaints of having an allergiv rxn. Pt is unsure what the allergen is but the sympoms have greatly increased over the last 15 minutes. Pt arrives with a hoarse voice, eye swelling, and swelling around her chin. She has a hx of allergic reactions but never one this bad. Airway appears to be clear but the pts voice is very hoarse. Attending brought to bedside. PIV started and pt placed on monitor.

## 2024-12-18 NOTE — ED PROVIDER NOTES
EMERGENCY DEPARTMENT ENCOUNTER    Pt Name: Reta Flores  MRN: [id number]  Birthdate 1965  Date of evaluation: 12/17/24  CHIEF COMPLAINT       Chief Complaint   Patient presents with    Allergic Reaction     Unknown cause, last Friday started. Sore to left eye then today worse with throat swelling and facial swelling. Pt reports taking benadryl at 1pm     HISTORY OF PRESENT ILLNESS   The history is provided by the patient and medical records.  The patient is a 59-year-old female who presents to the ED for allergic reaction.  Patient notes irritated left eye and facial swelling.  She reports taking Benadryl at 1 PM.  She also reports intermittent areas of red rash on cheeks and anterior neck.  She reports allergy to dust mites.  She just returned from a trip to [place] where she staying in a hotel room.    REVIEW OF SYSTEMS     Review of Systems  All other systems reviewed and are negative.    PASTMEDICAL HISTORY     Past Medical History:   Diagnosis Date    Abdominal pain     COVID-19     x2    COVID-19 vaccine administered     Depression     Eczema     Environmental allergies     dust mites,etc    Gastrojejunal ulcer     Hyperlipidemia     Hypertension     Kidney stones     Nausea & vomiting     Under care of team     pcp dr whalen    Wears glasses      Past Problem List  Patient Active Problem List   Diagnosis Code    Acute gastric ulcer without hemorrhage or perforation K25.3    Pain of upper abdomen R10.10     SURGICAL HISTORY       Past Surgical History:   Procedure Laterality Date    BREAST SURGERY Bilateral     augmentation    CHOLECYSTECTOMY      2002    ESOPHAGOGASTRODUODENOSCOPY  04/15/2024    GASTRIC BYPASS SURGERY      2002 in [place]    OVARIAN CYST REMOVAL      RHINOPLASTY      TYMPANOPLASTY      UPPER GASTROINTESTINAL ENDOSCOPY N/A 4/15/2024    ESOPHAGOGASTRODUODENOSCOPY - GI SCHEDULED performed by Iftikhar Erickson DO at [facility] OR    UPPER GASTROINTESTINAL ENDOSCOPY N/A 10/4/2024     medical records including labs, notes and imaging.    Tests considered but not ordered and why:  N/A    Imaging that is independently reviewed and interpreted by me as: N/A    See more data below for the lab and radiology tests and orders.    3)  Treatment and Disposition    Patient repeat assessment: Patient is hemodynamically stable.  After observation for approximately 10 hours in the ED, patient feels well.  Provided Rx for Pepcid and prednisone.  Patient has EpiPen's at home.  Advised to follow-up with allergist if symptoms continue.    All questions answered.  Given strict return precautions.  No further work-up indicated at this time.    Social determinants of health impacting treatment or disposition:  None    Shared Decision Making completed with patient regarding risks and benefits of admission versus discharge.  Patient decides to be discharged home.    Code Status Discussion:  Not discussed    MIPS:  N/A    \"ED Course\" Notes From Epic Narrator:         CRITICAL CARE:   N/A    PROCEDURES:  Procedures      DATA FOR LAB AND RADIOLOGY TESTS ORDERED BELOW ARE REVIEWED BY THE ED CLINICIAN:    RADIOLOGY: All x-rays, CT, MRI, and formal ultrasound images (except ED bedside ultrasound) are read by the radiologist, see reports below, unless otherwise noted in MDM or here.  Reports below are reviewed by myself.  No orders to display       LABS: Lab orders shown below, the results are reviewed by myself, and all abnormals are listed below.  Labs Reviewed - No data to display    Vitals Reviewed:    Vitals:    12/17/24 2032 12/17/24 2034 12/17/24 2042 12/17/24 2044   BP:       Pulse: 87 88 88 87   Resp: 17 18 18 14   Temp:       SpO2: 96% 96% 95% 96%   Weight:         MEDICATIONS GIVEN TO PATIENT THIS ENCOUNTER:  Orders Placed This Encounter   Medications    EPINEPHrine 1 MG/ML injection     Tosin Pelaez: cabinet override    EPINEPHrine 1 MG/ML injection 0.5 mg    famotidine (PEPCID) 20 mg in sodium chloride (PF)

## 2024-12-25 ENCOUNTER — HOSPITAL ENCOUNTER (EMERGENCY)
Age: 59
Discharge: HOME | End: 2024-12-25
Attending: EMERGENCY MEDICINE
Payer: COMMERCIAL

## 2024-12-25 VITALS
WEIGHT: 170 LBS | DIASTOLIC BLOOD PRESSURE: 72 MMHG | HEART RATE: 84 BPM | SYSTOLIC BLOOD PRESSURE: 116 MMHG | OXYGEN SATURATION: 95 % | BODY MASS INDEX: 26.63 KG/M2 | RESPIRATION RATE: 17 BRPM | TEMPERATURE: 98.7 F

## 2024-12-25 DIAGNOSIS — T78.40XA ACUTE ALLERGIC REACTION, INITIAL ENCOUNTER: Primary | ICD-10-CM

## 2024-12-25 PROCEDURE — 6360000002 HC RX W HCPCS: Performed by: EMERGENCY MEDICINE

## 2024-12-25 PROCEDURE — 96375 TX/PRO/DX INJ NEW DRUG ADDON: CPT

## 2024-12-25 PROCEDURE — 99284 EMERGENCY DEPT VISIT MOD MDM: CPT

## 2024-12-25 PROCEDURE — 2580000003 HC RX 258: Performed by: EMERGENCY MEDICINE

## 2024-12-25 PROCEDURE — 96376 TX/PRO/DX INJ SAME DRUG ADON: CPT

## 2024-12-25 PROCEDURE — 2500000003 HC RX 250 WO HCPCS: Performed by: EMERGENCY MEDICINE

## 2024-12-25 PROCEDURE — 96374 THER/PROPH/DIAG INJ IV PUSH: CPT

## 2024-12-25 RX ORDER — DIPHENHYDRAMINE HCL 25 MG
25 CAPSULE ORAL EVERY 6 HOURS PRN
Qty: 20 CAPSULE | Refills: 0 | Status: SHIPPED | OUTPATIENT
Start: 2024-12-25 | End: 2024-12-30

## 2024-12-25 RX ORDER — FAMOTIDINE 20 MG/1
20 TABLET, FILM COATED ORAL 2 TIMES DAILY
Qty: 30 TABLET | Refills: 0 | Status: SHIPPED | OUTPATIENT
Start: 2024-12-25

## 2024-12-25 RX ORDER — EPINEPHRINE 0.3 MG/.3ML
0.3 INJECTION SUBCUTANEOUS ONCE
Qty: 1 EACH | Refills: 0 | Status: SHIPPED | OUTPATIENT
Start: 2024-12-25 | End: 2024-12-25

## 2024-12-25 RX ORDER — DIPHENHYDRAMINE HYDROCHLORIDE 50 MG/ML
25 INJECTION INTRAMUSCULAR; INTRAVENOUS ONCE
Status: COMPLETED | OUTPATIENT
Start: 2024-12-25 | End: 2024-12-25

## 2024-12-25 RX ORDER — DEXAMETHASONE SODIUM PHOSPHATE 10 MG/ML
10 INJECTION, SOLUTION INTRAMUSCULAR; INTRAVENOUS ONCE
Status: COMPLETED | OUTPATIENT
Start: 2024-12-25 | End: 2024-12-25

## 2024-12-25 RX ORDER — PREDNISONE 20 MG/1
60 TABLET ORAL DAILY
Qty: 30 TABLET | Refills: 0 | Status: SHIPPED | OUTPATIENT
Start: 2024-12-25 | End: 2025-01-04

## 2024-12-25 RX ADMIN — DEXAMETHASONE SODIUM PHOSPHATE 10 MG: 10 INJECTION INTRAMUSCULAR; INTRAVENOUS at 15:26

## 2024-12-25 RX ADMIN — WATER 125 MG: 1 INJECTION INTRAMUSCULAR; INTRAVENOUS; SUBCUTANEOUS at 16:28

## 2024-12-25 RX ADMIN — FAMOTIDINE 20 MG: 10 INJECTION, SOLUTION INTRAVENOUS at 15:25

## 2024-12-25 RX ADMIN — DIPHENHYDRAMINE HYDROCHLORIDE 25 MG: 50 INJECTION INTRAMUSCULAR; INTRAVENOUS at 15:26

## 2024-12-25 RX ADMIN — DIPHENHYDRAMINE HYDROCHLORIDE 25 MG: 50 INJECTION INTRAMUSCULAR; INTRAVENOUS at 16:28

## 2024-12-25 ASSESSMENT — ENCOUNTER SYMPTOMS
ABDOMINAL PAIN: 0
FACIAL SWELLING: 1
BACK PAIN: 0
SHORTNESS OF BREATH: 0
ABDOMINAL DISTENTION: 0
EYE PAIN: 0
EYE DISCHARGE: 0
CHEST TIGHTNESS: 0

## 2024-12-25 NOTE — ED PROVIDER NOTES
EMERGENCY DEPARTMENT ENCOUNTER    Pt Name: Reta Flores  MRN: [id number]  Birthdate 1965  Date of evaluation: 12/25/24  CHIEF COMPLAINT       Chief Complaint   Patient presents with    Allergic Reaction     Swelling around eyes and face, tongue tingles, did epi pen 5-10 min ago, had a year ago also, feels it could be her makeup     HISTORY OF PRESENT ILLNESS   HPI   Patient is a 59-year-old female who presented to the emergency department secondary to allergic reaction.  Patient has a known allergy to dust mites however she used make-up that she had previously used before and noted swelling around her eyes as well as her face.  She has an EpiPen that she was given due to previous allergic reaction.  Of note, patient says symptoms have been on December 17, 2024 after using the same make-up by Stacy Heard.  Patient had more than make-up since the 17th.  Patient denies difficulty swallowing but she noted redness and swelling and itching of her face only where she applied the make-up.  Patient denied a previous history of angioedema she has not ACE inhibitors.  She takes metoprolol for blood pressure.  Patient had a similar allergic reaction when out of town that was attributed to shellfish's and she was discharged home with EpiPen's.  She also is allergic to ibuprofen amoxicillin and Cipro which she has not been exposed to.  Patient had the swelling is improved.  She denies chest pain, shortness of breath, nausea, vomiting, fevers or      REVIEW OF SYSTEMS     Review of Systems   Constitutional:  Negative for chills, diaphoresis and fever.   HENT:  Positive for facial swelling. Negative for congestion and ear pain.    Eyes:  Negative for pain, discharge and visual disturbance.   Respiratory:  Negative for chest tightness and shortness of breath.    Cardiovascular:  Negative for chest pain and palpitations.   Gastrointestinal:  Negative for abdominal distention and abdominal pain.   Genitourinary:  Negative for 
124/83, Temp: 98.7 °F (37.1 °C), Pulse: 79, Respirations: 16     RADIOLOGY:All plain film, CT, MRI, and formal ultrasound images (except ED bedside ultrasound) are read by the radiologist and the images and interpretations are directly viewed by the emergency physician.   No orders to display         LABS: All lab results were reviewed by myself, and all abnormals are listed below.  Labs Reviewed - No data to display        Disposition   DISPOSITION:    DISPOSITION Decision To Discharge 12/25/2024 06:00:48 PM   DISPOSITION CONDITION Stable           CLINICAL IMPRESSION:  1. Acute allergic reaction, initial encounter        PATIENT REFERRED TO:  Lizzeth Story MD  [address]  [place] [address]  [phone]            DISCHARGE MEDICATIONS:  New Prescriptions    DIPHENHYDRAMINE (BENADRYL) 25 MG CAPSULE    Take 1 capsule by mouth every 6 hours as needed for Itching    EPINEPHRINE (EPIPEN 2-CALVIN) 0.3 MG/0.3ML SOAJ INJECTION    Inject 0.3 mLs into the muscle once for 1 dose Use as directed for allergic reaction    FAMOTIDINE (PEPCID) 20 MG TABLET    Take 1 tablet by mouth 2 times daily    PREDNISONE (DELTASONE) 20 MG TABLET    Take 3 tablets by mouth daily for 10 days       Gino Nunez MD  Attending Emergency Physician             Gino Nunez MD  12/25/24 [id number]

## 2025-01-08 ENCOUNTER — HOSPITAL ENCOUNTER (EMERGENCY)
Age: 60
Discharge: HOME | End: 2025-01-08
Attending: EMERGENCY MEDICINE
Payer: COMMERCIAL

## 2025-01-08 VITALS
SYSTOLIC BLOOD PRESSURE: 106 MMHG | HEART RATE: 77 BPM | TEMPERATURE: 97.5 F | DIASTOLIC BLOOD PRESSURE: 74 MMHG | OXYGEN SATURATION: 98 % | WEIGHT: 160 LBS | HEIGHT: 67 IN | BODY MASS INDEX: 25.11 KG/M2 | RESPIRATION RATE: 18 BRPM

## 2025-01-08 DIAGNOSIS — T78.40XA ALLERGIC REACTION, INITIAL ENCOUNTER: Primary | ICD-10-CM

## 2025-01-08 PROCEDURE — 6360000002 HC RX W HCPCS: Performed by: EMERGENCY MEDICINE

## 2025-01-08 PROCEDURE — 99284 EMERGENCY DEPT VISIT MOD MDM: CPT

## 2025-01-08 PROCEDURE — 96374 THER/PROPH/DIAG INJ IV PUSH: CPT

## 2025-01-08 PROCEDURE — 96375 TX/PRO/DX INJ NEW DRUG ADDON: CPT

## 2025-01-08 PROCEDURE — 2580000003 HC RX 258: Performed by: EMERGENCY MEDICINE

## 2025-01-08 PROCEDURE — 2500000003 HC RX 250 WO HCPCS: Performed by: EMERGENCY MEDICINE

## 2025-01-08 PROCEDURE — 96376 TX/PRO/DX INJ SAME DRUG ADON: CPT

## 2025-01-08 RX ORDER — DIPHENHYDRAMINE HYDROCHLORIDE 50 MG/ML
25 INJECTION INTRAMUSCULAR; INTRAVENOUS ONCE
Status: COMPLETED | OUTPATIENT
Start: 2025-01-08 | End: 2025-01-08

## 2025-01-08 RX ORDER — FAMOTIDINE 20 MG/1
20 TABLET, FILM COATED ORAL 2 TIMES DAILY
Qty: 8 TABLET | Refills: 0 | Status: SHIPPED | OUTPATIENT
Start: 2025-01-08 | End: 2025-01-12

## 2025-01-08 RX ORDER — DIPHENHYDRAMINE HCL 25 MG
50 CAPSULE ORAL EVERY 8 HOURS
Qty: 18 CAPSULE | Refills: 0 | Status: SHIPPED | OUTPATIENT
Start: 2025-01-08 | End: 2025-01-11

## 2025-01-08 RX ORDER — PREDNISONE 50 MG/1
50 TABLET ORAL DAILY
Qty: 3 TABLET | Refills: 0 | Status: SHIPPED | OUTPATIENT
Start: 2025-01-08 | End: 2025-01-11

## 2025-01-08 RX ADMIN — DIPHENHYDRAMINE HYDROCHLORIDE 25 MG: 50 INJECTION INTRAMUSCULAR; INTRAVENOUS at 13:26

## 2025-01-08 RX ADMIN — FAMOTIDINE 20 MG: 10 INJECTION INTRAVENOUS at 12:07

## 2025-01-08 RX ADMIN — DIPHENHYDRAMINE HYDROCHLORIDE 25 MG: 50 INJECTION INTRAMUSCULAR; INTRAVENOUS at 12:05

## 2025-01-08 RX ADMIN — WATER 125 MG: 1 INJECTION INTRAMUSCULAR; INTRAVENOUS; SUBCUTANEOUS at 12:08

## 2025-01-08 ASSESSMENT — PAIN - FUNCTIONAL ASSESSMENT: PAIN_FUNCTIONAL_ASSESSMENT: NONE - DENIES PAIN

## 2025-01-08 NOTE — DISCHARGE INSTRUCTIONS
For the next 3 days take Pepcid twice per day, Benadryl 3 times per day, prednisone once per day.  If you have another reaction where you feel that your throat is closing take EpiPen and come back to the ER.  Follow-up with your primary care doctor and allergy specialist.

## 2025-01-08 NOTE — ED NOTES
States she's been dealing with an environmental allergy for quiet sometime, states she had to use her Epi pen prior to arrival

## 2025-01-08 NOTE — ED NOTES
Complains eyes feel like they are swelling and its tight feeling around her mouth, Dr Pitt notified and into assess patient

## 2025-01-08 NOTE — ED TRIAGE NOTES
Pt reports eye pain last night waking up with facial swelling and throat itching with tongue tingling. Reports unaware what she is allergic to, and is in the process of being tested. Pt reports she took an at home epi pen at 1100 pta to ER

## 2025-01-09 NOTE — ED PROVIDER NOTES
EMERGENCY DEPARTMENT ENCOUNTER    Pt Name: Reta Flores  MRN: [id number]  Birthdate 1965  Date of evaluation: 1/9/25  CHIEF COMPLAINT       Chief Complaint   Patient presents with    Allergic Reaction     HISTORY OF PRESENT ILLNESS   HPI  59-year-old with a history of hypertension presents to the ED for an allergic reaction that started about 1 hour prior to arrival.  Patient states that she was in her usual state of health, started having sudden onset swelling of the face and eyes, itchiness, feeling that her throat was closing so she took an EpiPen and came to the ED.  Patient states that in the last few months he has had multiple similar episodes, has been following with an allergist, no clear cause of these recurrent allergic reactions.  She denies chest pain, shortness of breath, fever/chills, nausea/vomiting, abdominal pain or any other acute complaints.    REVIEW OF SYSTEMS     Review of Systems   All other systems reviewed and are negative.    PASTMEDICAL HISTORY     Past Medical History:   Diagnosis Date    Abdominal pain     COVID-19     x2    COVID-19 vaccine administered     Depression     Eczema     Environmental allergies     dust mites,etc    Gastrojejunal ulcer     Hyperlipidemia     Hypertension     Kidney stones     Nausea & vomiting     Under care of team     pcp dr whalen    Wears glasses      SURGICAL HISTORY       Past Surgical History:   Procedure Laterality Date    BREAST SURGERY Bilateral     augmentation    CHOLECYSTECTOMY      2002    ESOPHAGOGASTRODUODENOSCOPY  04/15/2024    GASTRIC BYPASS SURGERY      2002 in [place]    OVARIAN CYST REMOVAL      RHINOPLASTY      TYMPANOPLASTY      UPPER GASTROINTESTINAL ENDOSCOPY N/A 4/15/2024    ESOPHAGOGASTRODUODENOSCOPY - GI SCHEDULED performed by Ifitkhar Erickson DO at [facility] OR    UPPER GASTROINTESTINAL ENDOSCOPY N/A 10/4/2024    ESOPHAGOGASTRODUODENOSCOPY WITH FOREIGN BODY REMOVAL performed by Iftikhar Erickson DO at [facility] OR

## 2025-02-18 DIAGNOSIS — Z01.818 PRE-OP TESTING: Primary | ICD-10-CM

## 2025-02-19 ENCOUNTER — OFFICE VISIT (OUTPATIENT)
Dept: SURGERY | Age: 60
End: 2025-02-19
Payer: COMMERCIAL

## 2025-02-19 ENCOUNTER — HOSPITAL ENCOUNTER (OUTPATIENT)
Age: 60
Discharge: HOME | End: 2025-02-19
Payer: COMMERCIAL

## 2025-02-19 VITALS
HEART RATE: 75 BPM | DIASTOLIC BLOOD PRESSURE: 75 MMHG | BODY MASS INDEX: 25.58 KG/M2 | SYSTOLIC BLOOD PRESSURE: 106 MMHG | HEIGHT: 67 IN | WEIGHT: 163 LBS

## 2025-02-19 DIAGNOSIS — M62.08 DIASTASIS OF RECTUS ABDOMINIS: ICD-10-CM

## 2025-02-19 DIAGNOSIS — E65 SYMPTOMATIC ABDOMINAL PANNICULUS: Primary | ICD-10-CM

## 2025-02-19 DIAGNOSIS — Z01.818 PRE-OP TESTING: ICD-10-CM

## 2025-02-19 PROCEDURE — G0480 DRUG TEST DEF 1-7 CLASSES: HCPCS

## 2025-02-19 PROCEDURE — 99203 OFFICE O/P NEW LOW 30 MIN: CPT | Performed by: PLASTIC SURGERY

## 2025-02-19 NOTE — PROGRESS NOTES
PANNICULECTOMY HEALTH CHECKLIST:  Height: Height: 170.2 cm (5' 7\")   Weight: Weight - Scale: 73.9 kg (163 lb)    BMI: Body mass index is 25.53 kg/m².   Does your pannus affect your daily activities and quality of life?  Yes      How lon years   Which daily living activities are affected in the following ways?  Cleaning of feet Yes   Odor Yes   Interferes with exercise Yes   [place] No   Painful pulling of abdominal skin Yes   Clothes not fitting  Yes          Urination  No   Pulling of pubic hair  No    Seat belt not fitting  Yes   Other:      Back pain?   No        Any Treatment?  NA        Any Testing?  NA     Have you had bariatric surgery?  Yes  gastric by pass             Initial weight: 270        Weight stable for how long? Over 1 year   Do you have a hernia? NA        Testing:    Have you had a hernia repair in the past?     No        Was mesh used?  NA        Surgeon for hernia: NA   Any Rashes/Ulcers under folds of skin?  Yes- Yeast infections          Medications used:                                                        OTC Monistat                                                                                                RX          Notes:   
is safe. It is important that you participate in follow-up care, return for aftercare, and promote your recovery after surgery.  REVISION POLICY  Surgical revision surgery is a common part of elective surgery. Your procedure will not stop you from aging, sagging, scarring, or experiencing ongoing skin changes that are more genetically controlled. If revision surgery is either desired or advisable within one year after the initial surgery, there may be a Fees associated with it.  HEALTH INSURANCE  Most health insurance companies exclude coverage for cosmetic surgical operations or any resulting complications. Please carefully review your health insurance subscriber-information pamphlet. Most insurance plans exclude coverage for secondary or revisionary surgery due to complications of cosmetic surgery. It is unethical and fraudulent to bill insurance for cosmetic procedures. We cannot participate in such activities.  FINANCIAL RESPONSIBILITIES   The cost of surgery involves several charges for the services provided. The total includes fees charged by your surgeon, the cost of surgical supplies, anesthesia, laboratory tests, and possible outpatient hospital charges, depending on where the surgery is performed. Depending on whether the cost of surgery is covered by an insurance plan, you will be responsible for necessary co-payments, deductibles, and charges not covered. The fees charged for this procedure do not include any potential future costs for additional procedures that you elect to have or require in order to revise, optimize, or complete your outcome. Additional costs may occur should complications develop from the surgery. Secondary surgery or hospital day-surgery charges involved with revision surgery will also be your responsibility. In signing  the consent for this surgery/procedure, you acknowledge that you have been informed about its risk and consequences and accept responsibility for the clinical

## 2025-02-24 LAB
3-OH-COTININE URINE: <50 NG/ML
ANABASINE URINE: <5 NG/ML
COTININE, URINE: <15 NG/ML
NICOTINE URINE: <15 NG/ML

## 2025-03-11 ENCOUNTER — TELEPHONE (OUTPATIENT)
Dept: SURGERY | Age: 60
End: 2025-03-11

## 2025-03-11 ENCOUNTER — PREP FOR PROCEDURE (OUTPATIENT)
Dept: SURGERY | Age: 60
End: 2025-03-11

## 2025-03-11 DIAGNOSIS — M62.08 DIASTASIS OF RECTUS ABDOMINIS: ICD-10-CM

## 2025-03-11 DIAGNOSIS — E65 SYMPTOMATIC ABDOMINAL PANNICULUS: ICD-10-CM

## 2025-03-11 NOTE — TELEPHONE ENCOUNTER
Spoke to patient, surgery scheduled at [facility]. Patient confirmed and information sent to patient(s) kishan.    Surgery date/time: 11/20/25 at 9am  Arrival time: 7am  PAT: 11/16/25 at 11am  Post op: 11/26/25 at 9:45am  Post op: 12/3/25 at 10:15am

## 2025-03-14 ENCOUNTER — HOSPITAL ENCOUNTER (EMERGENCY)
Age: 60
Discharge: HOME | End: 2025-03-14
Attending: EMERGENCY MEDICINE
Payer: COMMERCIAL

## 2025-03-14 VITALS
HEIGHT: 67 IN | OXYGEN SATURATION: 99 % | RESPIRATION RATE: 18 BRPM | SYSTOLIC BLOOD PRESSURE: 133 MMHG | TEMPERATURE: 98.7 F | WEIGHT: 160 LBS | HEART RATE: 74 BPM | BODY MASS INDEX: 25.11 KG/M2 | DIASTOLIC BLOOD PRESSURE: 88 MMHG

## 2025-03-14 DIAGNOSIS — E87.1 HYPONATREMIA: Primary | ICD-10-CM

## 2025-03-14 LAB
ANION GAP SERPL CALCULATED.3IONS-SCNC: 14 MMOL/L (ref 9–16)
BASOPHILS # BLD: <0.03 K/UL (ref 0–0.2)
BASOPHILS NFR BLD: 0 % (ref 0–2)
BUN SERPL-MCNC: 11 MG/DL (ref 8–23)
CALCIUM SERPL-MCNC: 9.8 MG/DL (ref 8.8–10.2)
CHLORIDE SERPL-SCNC: 96 MMOL/L (ref 98–107)
CO2 SERPL-SCNC: 23 MMOL/L (ref 20–31)
CREAT SERPL-MCNC: 0.7 MG/DL (ref 0.5–0.9)
EOSINOPHIL # BLD: <0.03 K/UL (ref 0–0.44)
EOSINOPHILS RELATIVE PERCENT: 0 % (ref 1–4)
ERYTHROCYTE [DISTWIDTH] IN BLOOD BY AUTOMATED COUNT: 12.8 % (ref 11.8–14.4)
GFR, ESTIMATED: >90 ML/MIN/1.73M2
GLUCOSE SERPL-MCNC: 85 MG/DL (ref 82–115)
HCT VFR BLD AUTO: 35.4 % (ref 36.3–47.1)
HGB BLD-MCNC: 12.8 G/DL (ref 11.9–15.1)
IMM GRANULOCYTES # BLD AUTO: 0.02 K/UL (ref 0–0.3)
IMM GRANULOCYTES NFR BLD: 0 %
LYMPHOCYTES NFR BLD: 1.07 K/UL (ref 1.1–3.7)
LYMPHOCYTES RELATIVE PERCENT: 17 % (ref 24–43)
MCH RBC QN AUTO: 32.9 PG (ref 25.2–33.5)
MCHC RBC AUTO-ENTMCNC: 36.2 G/DL (ref 28.4–34.8)
MCV RBC AUTO: 91 FL (ref 82.6–102.9)
MONOCYTES NFR BLD: 0.36 K/UL (ref 0.1–1.2)
MONOCYTES NFR BLD: 6 % (ref 3–12)
NEUTROPHILS NFR BLD: 77 % (ref 36–65)
NEUTS SEG NFR BLD: 4.67 K/UL (ref 1.5–8.1)
NRBC BLD-RTO: 0 PER 100 WBC
PLATELET # BLD AUTO: 225 K/UL (ref 138–453)
PMV BLD AUTO: 8.6 FL (ref 8.1–13.5)
POTASSIUM SERPL-SCNC: 4.3 MMOL/L (ref 3.7–5.3)
RBC # BLD AUTO: 3.89 M/UL (ref 3.95–5.11)
SODIUM SERPL-SCNC: 133 MMOL/L (ref 136–145)
WBC OTHER # BLD: 6.1 K/UL (ref 3.5–11.3)

## 2025-03-14 PROCEDURE — 99283 EMERGENCY DEPT VISIT LOW MDM: CPT

## 2025-03-14 PROCEDURE — 80048 BASIC METABOLIC PNL TOTAL CA: CPT

## 2025-03-14 PROCEDURE — 85025 COMPLETE CBC W/AUTO DIFF WBC: CPT

## 2025-03-14 ASSESSMENT — PAIN - FUNCTIONAL ASSESSMENT: PAIN_FUNCTIONAL_ASSESSMENT: NONE - DENIES PAIN

## 2025-03-14 NOTE — DISCHARGE INSTRUCTIONS
As we discussed your sodium levels have improved today.  They are still somewhat low.  Adding a bit of sodium into your diet-adding some additional salt to certain foods or even drinking some chicken or beef broth can help keep sodium levels stable.  I do highly recommend follow-up with your doctor for continued monitoring of electrolytes.    If you experience dizziness feelings of confusion or lightheadedness this could indicate a change in levels and should be addressed promptly.

## 2025-03-15 NOTE — ED PROVIDER NOTES
EMERGENCY DEPARTMENT ENCOUNTER    Pt Name: Reta Flores  MRN: [id number]  Birthdate 1965  Date of evaluation: 3/14/25  CHIEF COMPLAINT       Chief Complaint   Patient presents with    Abnormal Lab     Dr. Sent patient to ED as her Na+ level was 129     HISTORY OF PRESENT ILLNESS   60-year-old female presents to the emergency room for recheck of labs.  Patient had outpatient labs on 3/11/2025.  She was called by her doctor and told that her sodium levels were quite low.  She was instructed to come to the emergency room.  Patient has history of gastric bypass and is on Lamictal and Latuda for psychiatric reasons.  She was told these medications may be causing the low sodium levels.  These are not new medications.             REVIEW OF SYSTEMS     Review of Systems   Neurological:  Positive for dizziness.     PASTMEDICAL HISTORY     Past Medical History:   Diagnosis Date    Abdominal pain     COVID-19     x2    COVID-19 vaccine administered     Depression     Eczema     Environmental allergies     dust mites,etc    Gastrojejunal ulcer     Hyperlipidemia     Hypertension     Kidney stones     Nausea & vomiting     Under care of team     pcp dr whalen    Wears glasses      Past Problem List  Patient Active Problem List   Diagnosis Code    Acute gastric ulcer without hemorrhage or perforation K25.3    Pain of upper abdomen R10.10    Symptomatic abdominal panniculus E65    Diastasis of rectus abdominis M62.08     SURGICAL HISTORY       Past Surgical History:   Procedure Laterality Date    BREAST SURGERY Bilateral     augmentation    CHOLECYSTECTOMY      2002    ESOPHAGOGASTRODUODENOSCOPY  04/15/2024    GASTRIC BYPASS SURGERY      2002 in [place]    OVARIAN CYST REMOVAL      RHINOPLASTY      TYMPANOPLASTY      UPPER GASTROINTESTINAL ENDOSCOPY N/A 4/15/2024    ESOPHAGOGASTRODUODENOSCOPY - GI SCHEDULED performed by Iftikhar Erickson DO at [facility] OR    UPPER GASTROINTESTINAL ENDOSCOPY N/A 10/4/2024

## 2025-03-17 ENCOUNTER — HOSPITAL ENCOUNTER (EMERGENCY)
Age: 60
Discharge: HOME | End: 2025-03-17
Attending: EMERGENCY MEDICINE
Payer: COMMERCIAL

## 2025-03-17 VITALS
SYSTOLIC BLOOD PRESSURE: 148 MMHG | RESPIRATION RATE: 16 BRPM | OXYGEN SATURATION: 98 % | HEIGHT: 66 IN | DIASTOLIC BLOOD PRESSURE: 90 MMHG | BODY MASS INDEX: 25.71 KG/M2 | TEMPERATURE: 98.4 F | HEART RATE: 85 BPM | WEIGHT: 160 LBS

## 2025-03-17 DIAGNOSIS — T78.40XA ALLERGIC REACTION, INITIAL ENCOUNTER: Primary | ICD-10-CM

## 2025-03-17 PROCEDURE — 2580000003 HC RX 258: Performed by: NURSE PRACTITIONER

## 2025-03-17 PROCEDURE — 6360000002 HC RX W HCPCS: Performed by: NURSE PRACTITIONER

## 2025-03-17 PROCEDURE — 96374 THER/PROPH/DIAG INJ IV PUSH: CPT

## 2025-03-17 PROCEDURE — 96375 TX/PRO/DX INJ NEW DRUG ADDON: CPT

## 2025-03-17 PROCEDURE — 96376 TX/PRO/DX INJ SAME DRUG ADON: CPT

## 2025-03-17 PROCEDURE — 99284 EMERGENCY DEPT VISIT MOD MDM: CPT

## 2025-03-17 PROCEDURE — 2500000003 HC RX 250 WO HCPCS: Performed by: NURSE PRACTITIONER

## 2025-03-17 RX ORDER — DIPHENHYDRAMINE HYDROCHLORIDE 50 MG/ML
25 INJECTION, SOLUTION INTRAMUSCULAR; INTRAVENOUS ONCE
Status: COMPLETED | OUTPATIENT
Start: 2025-03-17 | End: 2025-03-17

## 2025-03-17 RX ORDER — PREDNISONE 20 MG/1
TABLET ORAL
Qty: 12 TABLET | Refills: 0 | Status: SHIPPED | OUTPATIENT
Start: 2025-03-17 | End: 2025-03-27

## 2025-03-17 RX ORDER — FAMOTIDINE 20 MG/1
20 TABLET, FILM COATED ORAL 2 TIMES DAILY
Qty: 30 TABLET | Refills: 0 | Status: SHIPPED | OUTPATIENT
Start: 2025-03-17

## 2025-03-17 RX ADMIN — DIPHENHYDRAMINE HYDROCHLORIDE 25 MG: 50 INJECTION INTRAMUSCULAR; INTRAVENOUS at 19:58

## 2025-03-17 RX ADMIN — DIPHENHYDRAMINE HYDROCHLORIDE 25 MG: 50 INJECTION INTRAMUSCULAR; INTRAVENOUS at 20:52

## 2025-03-17 RX ADMIN — FAMOTIDINE 20 MG: 10 INJECTION, SOLUTION INTRAVENOUS at 19:58

## 2025-03-17 RX ADMIN — WATER 125 MG: 1 INJECTION INTRAMUSCULAR; INTRAVENOUS; SUBCUTANEOUS at 19:58

## 2025-03-17 ASSESSMENT — PAIN DESCRIPTION - LOCATION: LOCATION: FACE

## 2025-03-17 ASSESSMENT — PAIN - FUNCTIONAL ASSESSMENT: PAIN_FUNCTIONAL_ASSESSMENT: 0-10

## 2025-03-17 ASSESSMENT — PAIN SCALES - GENERAL: PAINLEVEL_OUTOF10: 4

## 2025-03-17 ASSESSMENT — PAIN DESCRIPTION - FREQUENCY: FREQUENCY: CONTINUOUS

## 2025-03-17 ASSESSMENT — PAIN DESCRIPTION - PAIN TYPE: TYPE: ACUTE PAIN

## 2025-03-17 NOTE — ED PROVIDER NOTES
eMERGENCY dEPARTMENT  Attending Physician Attestation     Pt Name: Reta Flores  MRN: [id number]  Birthdate 1965  Date of evaluation: 3/17/25     Reta Flores is a 60 y.o. female with CC: Allergic Reaction (Left eye red and swollen and tongue tingling and feels throat swelling redness on right cheek)      Based on the medical record the care appears appropriate.  I was personally available for consultation in the Emergency Department.    Karlo Posey DO  Attending Emergency Physician                  Karlo Posey DO  03/17/25 [id number]    
program.  Efforts were made to edit the dictations but occasionally words are mis-transcribed.)    YADI MCCAULEY - Gisela Orantes APRN - AASHISH  03/17/25 [id number]

## 2025-03-18 ENCOUNTER — HOSPITAL ENCOUNTER (EMERGENCY)
Age: 60
Discharge: HOME | End: 2025-03-18
Attending: EMERGENCY MEDICINE
Payer: COMMERCIAL

## 2025-03-18 VITALS
DIASTOLIC BLOOD PRESSURE: 86 MMHG | OXYGEN SATURATION: 100 % | RESPIRATION RATE: 18 BRPM | HEART RATE: 82 BPM | TEMPERATURE: 97.4 F | SYSTOLIC BLOOD PRESSURE: 134 MMHG

## 2025-03-18 DIAGNOSIS — T78.40XA ALLERGIC REACTION, INITIAL ENCOUNTER: Primary | ICD-10-CM

## 2025-03-18 PROCEDURE — 96374 THER/PROPH/DIAG INJ IV PUSH: CPT

## 2025-03-18 PROCEDURE — 99284 EMERGENCY DEPT VISIT MOD MDM: CPT

## 2025-03-18 PROCEDURE — 96376 TX/PRO/DX INJ SAME DRUG ADON: CPT

## 2025-03-18 PROCEDURE — 2500000003 HC RX 250 WO HCPCS: Performed by: EMERGENCY MEDICINE

## 2025-03-18 PROCEDURE — 2580000003 HC RX 258: Performed by: EMERGENCY MEDICINE

## 2025-03-18 PROCEDURE — 6360000002 HC RX W HCPCS: Performed by: EMERGENCY MEDICINE

## 2025-03-18 PROCEDURE — 96375 TX/PRO/DX INJ NEW DRUG ADDON: CPT

## 2025-03-18 RX ORDER — DIPHENHYDRAMINE HYDROCHLORIDE 50 MG/ML
25 INJECTION, SOLUTION INTRAMUSCULAR; INTRAVENOUS ONCE
Status: COMPLETED | OUTPATIENT
Start: 2025-03-18 | End: 2025-03-18

## 2025-03-18 RX ORDER — MORPHINE SULFATE 4 MG/ML
4 INJECTION, SOLUTION INTRAMUSCULAR; INTRAVENOUS ONCE
Status: COMPLETED | OUTPATIENT
Start: 2025-03-18 | End: 2025-03-18

## 2025-03-18 RX ORDER — OXYCODONE AND ACETAMINOPHEN 5; 325 MG/1; MG/1
1 TABLET ORAL EVERY 6 HOURS PRN
Qty: 8 TABLET | Refills: 0 | Status: SHIPPED | OUTPATIENT
Start: 2025-03-18 | End: 2025-03-20

## 2025-03-18 RX ADMIN — DIPHENHYDRAMINE HYDROCHLORIDE 25 MG: 50 INJECTION INTRAMUSCULAR; INTRAVENOUS at 20:40

## 2025-03-18 RX ADMIN — SODIUM CHLORIDE, PRESERVATIVE FREE 20 MG: 5 INJECTION INTRAVENOUS at 18:17

## 2025-03-18 RX ADMIN — MORPHINE SULFATE 4 MG: 4 INJECTION, SOLUTION INTRAMUSCULAR; INTRAVENOUS at 20:40

## 2025-03-18 RX ADMIN — DIPHENHYDRAMINE HYDROCHLORIDE 25 MG: 50 INJECTION INTRAMUSCULAR; INTRAVENOUS at 18:16

## 2025-03-18 ASSESSMENT — PAIN DESCRIPTION - LOCATION: LOCATION: FACE

## 2025-03-18 ASSESSMENT — ENCOUNTER SYMPTOMS: TROUBLE SWALLOWING: 1

## 2025-03-18 ASSESSMENT — PAIN - FUNCTIONAL ASSESSMENT: PAIN_FUNCTIONAL_ASSESSMENT: 0-10

## 2025-03-18 ASSESSMENT — PAIN SCALES - GENERAL
PAINLEVEL_OUTOF10: 7
PAINLEVEL_OUTOF10: 6

## 2025-03-18 NOTE — DISCHARGE INSTRUCTIONS
Take Pepcid twice a day for the next 3 days, use Benadryl every 6 hours as needed for the next 2 to 3 days.  Take steroids as directed until complete.    Please follow-up with your family doctor as needed.  Return to the emergency department for worsening symptoms.

## 2025-03-18 NOTE — ED NOTES
Patient arrives to ED accompanied by  for c/o allergic reaction. About an hour pta she started to experience tingling to her tongue, itchy sensation to face, redness around eyes and feeling like her throat is going to close. She states she does have allergies to beeswax and dust mites, denies recent exposures. She did not use her epi pen that she has at home. Patient with even, non-labored respirations. No swelling of tongue or lips.

## 2025-03-19 NOTE — DISCHARGE INSTRUCTIONS
Continue with the medications prescribed for the allergic reaction.  You may use Percocet at home for more severe episodes of pain.  Using a narcotic and Benadryl at the same time may cause increased drowsiness.  Please take caution.

## 2025-03-19 NOTE — ED PROVIDER NOTES
ESOPHAGOGASTRODUODENOSCOPY - GI SCHEDULED performed by Iftikhar Erickson DO at [facility] OR    UPPER GASTROINTESTINAL ENDOSCOPY N/A 10/4/2024    ESOPHAGOGASTRODUODENOSCOPY WITH FOREIGN BODY REMOVAL performed by Iftikhar Erickson DO at [facility] OR     CURRENT MEDICATIONS       Previous Medications    ALBUTEROL SULFATE HFA (PROVENTIL;VENTOLIN;PROAIR) 108 (90 BASE) MCG/ACT INHALER    Inhale 2 puffs into the lungs every 6 hours as needed for Shortness of Breath    ATORVASTATIN (LIPITOR) 10 MG TABLET    TAKE 1 TABLET ONCE DAILY ORALLY ONCE A DAY 90 DAYS    AZELASTINE  MCG/SPRAY SOLN    1 spray by Nasal route daily    CALCIUM CARB-CHOLECALCIFEROL (CALCIUM 500 + D PO)    Take 1 tablet by mouth daily    CETIRIZINE (ZYRTEC) 10 MG TABLET    Take 1 tablet by mouth 2 times daily    CLONAZEPAM (KLONOPIN) 1 MG TABLET    Take 1 tablet by mouth at bedtime.    EPINEPHRINE (SYMJEPI) 0.3 MG/0.3ML SOSY INJECTION    Inject 0.3 mLs into the muscle as needed for Anaphylaxis    FAMOTIDINE (PEPCID) 20 MG TABLET    Take 1 tablet by mouth 2 times daily    FLUTICASONE (FLONASE) 50 MCG/ACT NASAL SPRAY    1 spray by Nasal route daily    LAMOTRIGINE (LAMICTAL) 100 MG TABLET    Take 1 tablet by mouth at bedtime    LATUDA 40 MG TABS TABLET    2 tablets at bedtime    METOPROLOL SUCCINATE (TOPROL XL) 25 MG EXTENDED RELEASE TABLET    Take 0.5 tablets by mouth daily    MULTIPLE VITAMINS-MINERALS (MULTIVITAMIN ADULTS) TABS    Take 1 tablet by mouth daily    ONDANSETRON (ZOFRAN-ODT) 4 MG DISINTEGRATING TABLET    Take 1 tablet by mouth 3 times daily as needed for Nausea or Vomiting    PREDNISONE (DELTASONE) 20 MG TABLET    3 tabs po qam for 2 days then 2 tabs qam for 2 days the 1 tab qam for 2 days    ZEPBOUND 2.5 MG/0.5ML SOAJ    Inject 0.5 mLs into the skin once a week     ALLERGIES     is allergic to ibuprofen, amoxicillin, and ciprofloxacin.  FAMILY HISTORY     has no family status information on file.      SOCIAL HISTORY       Social

## 2025-04-17 ENCOUNTER — OFFICE VISIT (OUTPATIENT)
Dept: BARIATRICS/WEIGHT MGMT | Age: 60
End: 2025-04-17
Payer: COMMERCIAL

## 2025-04-17 VITALS
HEIGHT: 66 IN | BODY MASS INDEX: 25.88 KG/M2 | WEIGHT: 161 LBS | SYSTOLIC BLOOD PRESSURE: 112 MMHG | OXYGEN SATURATION: 100 % | DIASTOLIC BLOOD PRESSURE: 62 MMHG | HEART RATE: 74 BPM

## 2025-04-17 DIAGNOSIS — Z98.84 S/P GASTRIC BYPASS: ICD-10-CM

## 2025-04-17 DIAGNOSIS — K90.89 OTHER SPECIFIED INTESTINAL MALABSORPTION: Primary | ICD-10-CM

## 2025-04-17 PROCEDURE — 99213 OFFICE O/P EST LOW 20 MIN: CPT | Performed by: SURGERY

## 2025-04-17 NOTE — PROGRESS NOTES
[facility]  Bariatric Surgery  Clinic Progress Note      PATIENT NAME: Reta Flores   YOB: 1965/2025  8:36 AM    ADMISSION DATE: No admission date for patient encounter.      TODAY'S DATE: 4/17/2025    CHIEF COMPLAINT: Patient is here for follow-up after bariatric surgery      HISTORY OF PRESENT ILLNESS:  The patient is a 60 y.o. female  who presents with history of laparoscopic gastric bypass in 2002 with Dr. Samuels in [place].  She is here for long-term follow-up after her surgery.  Her highest weight was 270 pounds and her lowest weight was 158.  She also has a history of cholecystectomy in 2002 and appendectomy in 2022.  Patient denies history of MI, DVT, PE, renal failure, hepatic failure and stroke.    Patient had an EGD on 10/4/2024 which showed no ulcer on endoscopy.  [place] limb normal for 20 cm, 4 cm pouch, no stomal dilatation.    Currently doing well.  Able to tolerate diet.  Passing flatus and having bowel movements.  No complaint of any nausea or vomiting.  Patient was started on Zepbound by primary care physician.  Patient has lost approximately 20 kg after he was starting to use this.    Past Medical History:        Diagnosis Date    Abdominal pain     COVID-19     x2    COVID-19 vaccine administered     Depression     Eczema     Environmental allergies     dust mites,etc    Gastrojejunal ulcer     Hyperlipidemia     Hypertension     Kidney stones     Nausea & vomiting     Under care of team     pcp dr whalen    Wears glasses        Past Surgical History:        Procedure Laterality Date    BREAST SURGERY Bilateral     augmentation    CHOLECYSTECTOMY      2002    ESOPHAGOGASTRODUODENOSCOPY  04/15/2024    GASTRIC BYPASS SURGERY      2002 in [place]    OVARIAN CYST REMOVAL      RHINOPLASTY      TYMPANOPLASTY      UPPER GASTROINTESTINAL ENDOSCOPY N/A 4/15/2024    ESOPHAGOGASTRODUODENOSCOPY - GI SCHEDULED performed by Iftikhar Erickson DO at

## 2025-07-14 ENCOUNTER — APPOINTMENT (OUTPATIENT)
Dept: CT IMAGING | Age: 60
End: 2025-07-14
Payer: COMMERCIAL

## 2025-07-14 ENCOUNTER — HOSPITAL ENCOUNTER (EMERGENCY)
Age: 60
Discharge: HOME | End: 2025-07-14
Attending: EMERGENCY MEDICINE
Payer: COMMERCIAL

## 2025-07-14 VITALS
SYSTOLIC BLOOD PRESSURE: 114 MMHG | WEIGHT: 150 LBS | OXYGEN SATURATION: 100 % | RESPIRATION RATE: 18 BRPM | DIASTOLIC BLOOD PRESSURE: 60 MMHG | HEART RATE: 76 BPM | TEMPERATURE: 97.9 F | BODY MASS INDEX: 24.21 KG/M2

## 2025-07-14 VITALS
RESPIRATION RATE: 18 BRPM | SYSTOLIC BLOOD PRESSURE: 137 MMHG | TEMPERATURE: 97.5 F | OXYGEN SATURATION: 100 % | DIASTOLIC BLOOD PRESSURE: 84 MMHG | HEART RATE: 77 BPM

## 2025-07-14 DIAGNOSIS — N20.0 LEFT RENAL STONE: Primary | ICD-10-CM

## 2025-07-14 DIAGNOSIS — R10.9 FLANK PAIN: ICD-10-CM

## 2025-07-14 DIAGNOSIS — M54.32 SCIATICA OF LEFT SIDE: Primary | ICD-10-CM

## 2025-07-14 LAB
ANION GAP SERPL CALCULATED.3IONS-SCNC: 12 MMOL/L (ref 9–16)
BASOPHILS # BLD: <0.03 K/UL (ref 0–0.2)
BASOPHILS NFR BLD: 0 % (ref 0–2)
BILIRUB UR QL STRIP: NEGATIVE
BUN SERPL-MCNC: 13 MG/DL (ref 8–23)
CALCIUM SERPL-MCNC: 9.6 MG/DL (ref 8.8–10.2)
CHLORIDE SERPL-SCNC: 102 MMOL/L (ref 98–107)
CLARITY UR: CLEAR
CO2 SERPL-SCNC: 24 MMOL/L (ref 20–31)
COLOR UR: YELLOW
CREAT SERPL-MCNC: 0.7 MG/DL (ref 0.5–0.9)
EOSINOPHIL # BLD: <0.03 K/UL (ref 0–0.44)
EOSINOPHILS RELATIVE PERCENT: 0 % (ref 1–4)
EPI CELLS #/AREA URNS HPF: NORMAL /HPF (ref 0–5)
ERYTHROCYTE [DISTWIDTH] IN BLOOD BY AUTOMATED COUNT: 11.8 % (ref 11.8–14.4)
GFR, ESTIMATED: >90 ML/MIN/1.73M2
GLUCOSE SERPL-MCNC: 130 MG/DL (ref 82–115)
GLUCOSE UR STRIP-MCNC: NEGATIVE MG/DL
HCT VFR BLD AUTO: 38.7 % (ref 36.3–47.1)
HGB BLD-MCNC: 13.8 G/DL (ref 11.9–15.1)
HGB UR QL STRIP.AUTO: NEGATIVE
IMM GRANULOCYTES # BLD AUTO: 0.01 K/UL (ref 0–0.3)
IMM GRANULOCYTES NFR BLD: 0 %
KETONES UR STRIP-MCNC: NEGATIVE MG/DL
LEUKOCYTE ESTERASE UR QL STRIP: NEGATIVE
LYMPHOCYTES NFR BLD: 0.44 K/UL (ref 1.1–3.7)
LYMPHOCYTES RELATIVE PERCENT: 13 % (ref 24–43)
MCH RBC QN AUTO: 32.5 PG (ref 25.2–33.5)
MCHC RBC AUTO-ENTMCNC: 35.7 G/DL (ref 28.4–34.8)
MCV RBC AUTO: 91.3 FL (ref 82.6–102.9)
MONOCYTES NFR BLD: 0.06 K/UL (ref 0.1–1.2)
MONOCYTES NFR BLD: 2 % (ref 3–12)
NEUTROPHILS NFR BLD: 86 % (ref 36–65)
NEUTS SEG NFR BLD: 3.02 K/UL (ref 1.5–8.1)
NITRITE UR QL STRIP: NEGATIVE
NRBC BLD-RTO: 0 PER 100 WBC
PH UR STRIP: 6.5 [PH] (ref 5–8)
PLATELET # BLD AUTO: 170 K/UL (ref 138–453)
PMV BLD AUTO: 9.3 FL (ref 8.1–13.5)
POTASSIUM SERPL-SCNC: 4.3 MMOL/L (ref 3.7–5.3)
PROT UR STRIP-MCNC: NEGATIVE MG/DL
RBC # BLD AUTO: 4.24 M/UL (ref 3.95–5.11)
RBC #/AREA URNS HPF: NORMAL /HPF (ref 0–2)
SODIUM SERPL-SCNC: 138 MMOL/L (ref 136–145)
SP GR UR STRIP: 1.01 (ref 1–1.03)
UROBILINOGEN UR STRIP-ACNC: NORMAL EU/DL (ref 0–1)
WBC #/AREA URNS HPF: NORMAL /HPF (ref 0–5)
WBC OTHER # BLD: 3.5 K/UL (ref 3.5–11.3)

## 2025-07-14 PROCEDURE — 99284 EMERGENCY DEPT VISIT MOD MDM: CPT

## 2025-07-14 PROCEDURE — 6360000002 HC RX W HCPCS: Performed by: NURSE PRACTITIONER

## 2025-07-14 PROCEDURE — 96376 TX/PRO/DX INJ SAME DRUG ADON: CPT

## 2025-07-14 PROCEDURE — 85025 COMPLETE CBC W/AUTO DIFF WBC: CPT

## 2025-07-14 PROCEDURE — 6370000000 HC RX 637 (ALT 250 FOR IP): Performed by: EMERGENCY MEDICINE

## 2025-07-14 PROCEDURE — 96372 THER/PROPH/DIAG INJ SC/IM: CPT

## 2025-07-14 PROCEDURE — 81001 URINALYSIS AUTO W/SCOPE: CPT

## 2025-07-14 PROCEDURE — 6360000002 HC RX W HCPCS: Performed by: EMERGENCY MEDICINE

## 2025-07-14 PROCEDURE — 80048 BASIC METABOLIC PNL TOTAL CA: CPT

## 2025-07-14 PROCEDURE — 96374 THER/PROPH/DIAG INJ IV PUSH: CPT

## 2025-07-14 PROCEDURE — 96375 TX/PRO/DX INJ NEW DRUG ADDON: CPT

## 2025-07-14 PROCEDURE — 74176 CT ABD & PELVIS W/O CONTRAST: CPT

## 2025-07-14 RX ORDER — ONDANSETRON 4 MG/1
4 TABLET, ORALLY DISINTEGRATING ORAL 3 TIMES DAILY PRN
Qty: 21 TABLET | Refills: 0 | Status: SHIPPED | OUTPATIENT
Start: 2025-07-14

## 2025-07-14 RX ORDER — MORPHINE SULFATE 2 MG/ML
2 INJECTION, SOLUTION INTRAMUSCULAR; INTRAVENOUS ONCE
Refills: 0 | Status: COMPLETED | OUTPATIENT
Start: 2025-07-14 | End: 2025-07-14

## 2025-07-14 RX ORDER — METHYLPREDNISOLONE 4 MG/1
TABLET ORAL
Qty: 1 KIT | Refills: 0 | Status: SHIPPED | OUTPATIENT
Start: 2025-07-14 | End: 2025-07-20

## 2025-07-14 RX ORDER — LIDOCAINE 50 MG/G
1 PATCH TOPICAL DAILY
Qty: 10 PATCH | Refills: 0 | Status: SHIPPED | OUTPATIENT
Start: 2025-07-14 | End: 2025-07-24

## 2025-07-14 RX ORDER — DEXAMETHASONE SODIUM PHOSPHATE 10 MG/ML
10 INJECTION, SOLUTION INTRAMUSCULAR; INTRAVENOUS ONCE
Status: COMPLETED | OUTPATIENT
Start: 2025-07-14 | End: 2025-07-14

## 2025-07-14 RX ORDER — LIDOCAINE 4 G/G
1 PATCH TOPICAL ONCE
Status: DISCONTINUED | OUTPATIENT
Start: 2025-07-14 | End: 2025-07-14 | Stop reason: HOSPADM

## 2025-07-14 RX ORDER — ONDANSETRON 2 MG/ML
4 INJECTION INTRAMUSCULAR; INTRAVENOUS ONCE
Status: COMPLETED | OUTPATIENT
Start: 2025-07-14 | End: 2025-07-14

## 2025-07-14 RX ORDER — TAMSULOSIN HYDROCHLORIDE 0.4 MG/1
0.4 CAPSULE ORAL DAILY
Qty: 30 CAPSULE | Refills: 0 | Status: SHIPPED | OUTPATIENT
Start: 2025-07-14

## 2025-07-14 RX ORDER — ACETAMINOPHEN 500 MG
1000 TABLET ORAL EVERY 6 HOURS PRN
Qty: 60 TABLET | Refills: 1 | Status: SHIPPED | OUTPATIENT
Start: 2025-07-14

## 2025-07-14 RX ORDER — METHOCARBAMOL 750 MG/1
1500 TABLET, FILM COATED ORAL ONCE
Status: COMPLETED | OUTPATIENT
Start: 2025-07-14 | End: 2025-07-14

## 2025-07-14 RX ORDER — DIPHENHYDRAMINE HCL 25 MG
25 TABLET ORAL ONCE
Status: COMPLETED | OUTPATIENT
Start: 2025-07-14 | End: 2025-07-14

## 2025-07-14 RX ADMIN — ONDANSETRON 4 MG: 2 INJECTION, SOLUTION INTRAMUSCULAR; INTRAVENOUS at 18:59

## 2025-07-14 RX ADMIN — DEXAMETHASONE SODIUM PHOSPHATE 10 MG: 10 INJECTION, SOLUTION INTRAMUSCULAR; INTRAVENOUS at 07:54

## 2025-07-14 RX ADMIN — METHOCARBAMOL 1500 MG: 750 TABLET ORAL at 07:54

## 2025-07-14 RX ADMIN — MORPHINE SULFATE 2 MG: 2 INJECTION, SOLUTION INTRAMUSCULAR; INTRAVENOUS at 18:59

## 2025-07-14 RX ADMIN — DIPHENHYDRAMINE HCL 25 MG: 25 TABLET ORAL at 19:22

## 2025-07-14 RX ADMIN — ONDANSETRON 4 MG: 2 INJECTION, SOLUTION INTRAMUSCULAR; INTRAVENOUS at 16:56

## 2025-07-14 RX ADMIN — MORPHINE SULFATE 2 MG: 2 INJECTION, SOLUTION INTRAMUSCULAR; INTRAVENOUS at 16:56

## 2025-07-14 ASSESSMENT — PAIN DESCRIPTION - LOCATION
LOCATION: FLANK

## 2025-07-14 ASSESSMENT — PAIN SCALES - GENERAL
PAINLEVEL_OUTOF10: 8
PAINLEVEL_OUTOF10: 7
PAINLEVEL_OUTOF10: 9
PAINLEVEL_OUTOF10: 5
PAINLEVEL_OUTOF10: 6
PAINLEVEL_OUTOF10: 9

## 2025-07-14 ASSESSMENT — ENCOUNTER SYMPTOMS
BACK PAIN: 1
NAUSEA: 0
ABDOMINAL PAIN: 0
SHORTNESS OF BREATH: 0
DIARRHEA: 0
COLOR CHANGE: 0
VOMITING: 0

## 2025-07-14 ASSESSMENT — PAIN - FUNCTIONAL ASSESSMENT
PAIN_FUNCTIONAL_ASSESSMENT: 0-10
PAIN_FUNCTIONAL_ASSESSMENT: 0-10

## 2025-07-14 ASSESSMENT — PAIN DESCRIPTION - ORIENTATION: ORIENTATION: LEFT

## 2025-07-14 NOTE — ED PROVIDER NOTES
EMERGENCY DEPARTMENT ENCOUNTER   ATTENDING ATTESTATION     Pt Name: Reta Flores  MRN: [id number]  Birthdate 1965  Date of evaluation: 7/14/25   Reta Flores is a 60 y.o. female with CC: Flank Pain (Left sided. Thought it was sciatica but thinks it might be a kidney stone and urinating a lot. Hx of stones.)    MDM:   I performed a substantive part of the MDM during the patient’s E/M visit. I personally evaluated and examined the patient. I personally made or approved the documented management plan and acknowledge its risk of complications.    ED Course as of 07/14/25 1957 Mon Jul 14, 2025 1843 RBC, UA: None [AP]   1843 Epithelial Cells, UA: 0 TO 2 [AP]   1843 WBC, UA: 0 TO 2 [AP]   1843 Leukocyte Esterase, Urine: NEGATIVE [AP]   1843 Nitrite, Urine: NEGATIVE [AP]   1928 CT LUMBAR SPINE BONY RECONSTRUCTION  IMPRESSION:  No acute process.  Degenerative changes of the facet joints in the lower  lumbar spine.      [AP]   1944 CT ABDOMEN PELVIS WO CONTRAST Additional Contrast? None  IMPRESSION:  1. No acute abnormality in the abdomen or pelvis.   2. 3 mm nonobstructing left renal stone.  3. Status post cholecystectomy with chronic intrahepatic and common bile duct dilation, nonspecific in the post cholecystectomy setting.  4. Postoperative changes in the stomach likely from silvino-en-y gastric bypass.   [AP]   1956 I discussed findings with the patient.  At this time, she states that her pain is controlled.  She has urologist that she follows up as an outpatient.  I will provide her prescription for Toradol and Flomax in the meantime.  Discharged patient in hemodynamically stable condition with strict return precautions.  Patient verbalized understanding and she was agreeable to the plan of care. [AP]      ED Course User Index  [AP] Heaven Alcantara MD         Vitals Reviewed:    Vitals:    07/14/25 1549   BP: 137/84   Pulse: 77   Resp: 18   Temp: 97.5 °F (36.4 °C)   SpO2: 100%       Initial Pain Score: Pain Level:

## 2025-07-14 NOTE — ED PROVIDER NOTES
Team [facility] EMERGENCY DEPARTMENT  eMERGENCY dEPARTMENT eNCOUnter      Pt Name: Reta Flores  MRN: [id number]  Birthdate 1965  Date of evaluation: 7/14/2025  Provider: YADI Romero CNP    CHIEF COMPLAINT       Chief Complaint   Patient presents with    Flank Pain     Left sided. Thought it was sciatica but thinks it might be a kidney stone and urinating a lot. Hx of stones.         HISTORY OF PRESENT ILLNESS  (Location/Symptom, Timing/Onset, Context/Setting, Quality, Duration, Modifying Factors, Severity.)   Reta Flores is a 60 y.o. female who presents to the emergency department. C/o pain to there left mid back. States it is different than the pain she was having this morning. Denies fever, chills, injury, dysuria, hematuria. Denies change in bowel and/or bladder control and saddle anesthesia. Reports concern for a kidney stone. Reports urinary frequency, nausea. Rates her pain 9/10. States family will be picking her up today.      Nursing Notes were reviewed.    ALLERGIES     Ibuprofen, Amoxicillin, and Ciprofloxacin    CURRENT MEDICATIONS       Previous Medications    ALBUTEROL SULFATE HFA (PROVENTIL;VENTOLIN;PROAIR) 108 (90 BASE) MCG/ACT INHALER    Inhale 2 puffs into the lungs every 6 hours as needed for Shortness of Breath    ATORVASTATIN (LIPITOR) 10 MG TABLET    TAKE 1 TABLET ONCE DAILY ORALLY ONCE A DAY 90 DAYS    AZELASTINE  MCG/SPRAY SOLN    1 spray by Nasal route daily    CALCIUM CARB-CHOLECALCIFEROL (CALCIUM 500 + D PO)    Take 1 tablet by mouth daily    CETIRIZINE (ZYRTEC) 10 MG TABLET    Take 1 tablet by mouth 2 times daily    CLONAZEPAM (KLONOPIN) 1 MG TABLET    Take 1 tablet by mouth at bedtime.    EPINEPHRINE (SYMJEPI) 0.3 MG/0.3ML SOSY INJECTION    Inject 0.3 mLs into the muscle as needed for Anaphylaxis    FAMOTIDINE (PEPCID) 20 MG TABLET    Take 1 tablet by mouth 2 times daily    FLUTICASONE (FLONASE) 50 MCG/ACT NASAL SPRAY    1 spray by Nasal route daily

## 2025-07-14 NOTE — ED PROVIDER NOTES
[facility] EMERGENCY DEPARTMENT  eMERGENCY dEPARTMENT eNCOUnter    Pt Name: Reta Flores  MRN: [id number]  Birthdate 1965  Date of evaluation: 7/14/25  CHIEF COMPLAINT       Chief Complaint   Patient presents with    Back Pain     Left sided, reports pain going into toes.      HISTORY OF PRESENT ILLNESS   HPI  Patient is a 60-year-old female presents emergency room with complaints of left buttock pain that radiates down her left lower extremity.  Patient has a previous history of sciatica and this feels similar.  Patient states pain started yesterday.  Patient denies any heavy lifting twisting bending or direct,.  Patient is any bowel or bladder.  Patient denies saddle paresthesias  REVIEW OF SYSTEMS     Constitutional: No fever  Eye: No visual changes  Ear/Nose/Mouth/Throat: No sore throat  Respiratory: No shortness of breath  Cardiovascular: No chest pain  Gastrointestinal: No nausea, no vomiting, no diarrhea  Genitourinary: No dysuria  Musculoskeletal: As above  Integumentary: No rash  Neurologic: No dizziness  Psychiatric: No anxiety, no depression  All systems otherwise negative.        PAST MEDICAL HISTORY     Past Medical History:   Diagnosis Date    Abdominal pain     COVID-19     x2    COVID-19 vaccine administered     Depression     Eczema     Environmental allergies     dust mites,etc    Gastrojejunal ulcer     Hyperlipidemia     Hypertension     Kidney stones     Nausea & vomiting     Under care of team     pcp dr whalen    Wears glasses      SURGICAL HISTORY       Past Surgical History:   Procedure Laterality Date    BREAST SURGERY Bilateral     augmentation    CHOLECYSTECTOMY      2002    ESOPHAGOGASTRODUODENOSCOPY  04/15/2024    GASTRIC BYPASS SURGERY      2002 in [place]    OVARIAN CYST REMOVAL      RHINOPLASTY      TYMPANOPLASTY      UPPER GASTROINTESTINAL ENDOSCOPY N/A 4/15/2024    ESOPHAGOGASTRODUODENOSCOPY - GI SCHEDULED performed by Iftikhar Erickosn DO at [facility] OR    [facility]

## 2025-07-14 NOTE — ED NOTES
Patient itching s/p morphine administration. IV site appears reddened. IV removed and order received for PO Benadryl.

## 2025-07-14 NOTE — DISCHARGE INSTRUCTIONS
Take medications as prescribed.  Follow-up with your urologist.  Return back to the emergency department immediately if you notice any concerning or worsening signs or symptoms.

## 2025-07-17 ENCOUNTER — HOSPITAL ENCOUNTER (EMERGENCY)
Age: 60
Discharge: HOME | End: 2025-07-17
Attending: EMERGENCY MEDICINE
Payer: COMMERCIAL

## 2025-07-17 ENCOUNTER — APPOINTMENT (OUTPATIENT)
Dept: ULTRASOUND IMAGING | Age: 60
End: 2025-07-17
Payer: COMMERCIAL

## 2025-07-17 VITALS
SYSTOLIC BLOOD PRESSURE: 139 MMHG | DIASTOLIC BLOOD PRESSURE: 78 MMHG | BODY MASS INDEX: 24.21 KG/M2 | WEIGHT: 150 LBS | OXYGEN SATURATION: 100 % | RESPIRATION RATE: 15 BRPM | HEART RATE: 66 BPM

## 2025-07-17 DIAGNOSIS — R10.9 LEFT FLANK PAIN: Primary | ICD-10-CM

## 2025-07-17 LAB
BILIRUB UR QL STRIP: NEGATIVE
CLARITY UR: CLEAR
COLOR UR: YELLOW
GLUCOSE UR STRIP-MCNC: NEGATIVE MG/DL
HGB UR QL STRIP.AUTO: NEGATIVE
KETONES UR STRIP-MCNC: ABNORMAL MG/DL
LEUKOCYTE ESTERASE UR QL STRIP: NEGATIVE
NITRITE UR QL STRIP: NEGATIVE
PH UR STRIP: 6.5 [PH] (ref 5–8)
PROT UR STRIP-MCNC: NEGATIVE MG/DL
SP GR UR STRIP: 1.01 (ref 1–1.03)
UROBILINOGEN UR STRIP-ACNC: NORMAL EU/DL (ref 0–1)

## 2025-07-17 PROCEDURE — 96374 THER/PROPH/DIAG INJ IV PUSH: CPT

## 2025-07-17 PROCEDURE — 6360000002 HC RX W HCPCS: Performed by: EMERGENCY MEDICINE

## 2025-07-17 PROCEDURE — 2580000003 HC RX 258: Performed by: EMERGENCY MEDICINE

## 2025-07-17 PROCEDURE — 81003 URINALYSIS AUTO W/O SCOPE: CPT

## 2025-07-17 PROCEDURE — 96375 TX/PRO/DX INJ NEW DRUG ADDON: CPT

## 2025-07-17 PROCEDURE — 99284 EMERGENCY DEPT VISIT MOD MDM: CPT

## 2025-07-17 PROCEDURE — 76775 US EXAM ABDO BACK WALL LIM: CPT

## 2025-07-17 RX ORDER — METOCLOPRAMIDE 10 MG/1
10 TABLET ORAL 3 TIMES DAILY PRN
Qty: 20 TABLET | Refills: 3 | Status: SHIPPED | OUTPATIENT
Start: 2025-07-17

## 2025-07-17 RX ORDER — KETOROLAC TROMETHAMINE 15 MG/ML
15 INJECTION, SOLUTION INTRAMUSCULAR; INTRAVENOUS ONCE
Status: COMPLETED | OUTPATIENT
Start: 2025-07-17 | End: 2025-07-17

## 2025-07-17 RX ORDER — 0.9 % SODIUM CHLORIDE 0.9 %
1000 INTRAVENOUS SOLUTION INTRAVENOUS ONCE
Status: COMPLETED | OUTPATIENT
Start: 2025-07-17 | End: 2025-07-17

## 2025-07-17 RX ORDER — ONDANSETRON 2 MG/ML
4 INJECTION INTRAMUSCULAR; INTRAVENOUS ONCE
Status: COMPLETED | OUTPATIENT
Start: 2025-07-17 | End: 2025-07-17

## 2025-07-17 RX ADMIN — KETOROLAC TROMETHAMINE 15 MG: 15 INJECTION, SOLUTION INTRAMUSCULAR; INTRAVENOUS at 13:51

## 2025-07-17 RX ADMIN — SODIUM CHLORIDE 1000 ML: 9 INJECTION, SOLUTION INTRAVENOUS at 13:51

## 2025-07-17 RX ADMIN — ONDANSETRON 4 MG: 2 INJECTION, SOLUTION INTRAMUSCULAR; INTRAVENOUS at 13:50

## 2025-07-17 ASSESSMENT — PAIN - FUNCTIONAL ASSESSMENT: PAIN_FUNCTIONAL_ASSESSMENT: 0-10

## 2025-07-17 ASSESSMENT — PAIN SCALES - GENERAL
PAINLEVEL_OUTOF10: 8
PAINLEVEL_OUTOF10: 6

## 2025-07-17 NOTE — ED PROVIDER NOTES
[facility]  Emergency Medicine Department    Pt Name: Reta Flores  MRN: [id number]  Birthdate 1965  Date of evaluation: 7/17/2025  Provider: Shamir Ayala MD    CHIEF COMPLAINT     Chief Complaint   Patient presents with    Flank Pain     Dx with 3mm kidney stone on Monday. Pain is worse. Fedes Alberto        HISTORY OF PRESENT ILLNESS  (Location/Symptom, Timing/Onset, Context/Setting,Quality, Duration, Modifying Factors, Severity.)   Reta Flores is a 60 y.o. female who presents to the emergency department with left-sided back pain and nausea, consistent with previous kidney stone episodes. Patient was seen 3 days ago on the 14th with CT scan revealing a 3mm kidney stone. Yesterday patient experienced only a dull ache without nausea, but today symptoms have returned with increased pain intensity and nausea requiring Zofran this morning, which provided minimal relief. Patient has a significant history of kidney stones, including a previous 9mm stone that required laser lithotripsy performed by Dr. Dominguez at [facility]. Patient reports drinking increased amounts of water recently. Patient has history of gastric bypass surgery and does not experience vomiting due to this surgical history. Urine appears clear per patient report.    Nursing Notes were reviewed.    ALLERGIES     Ibuprofen, Amoxicillin, and Ciprofloxacin    CURRENT MEDICATIONS       Previous Medications    ACETAMINOPHEN (TYLENOL) 500 MG TABLET    Take 2 tablets by mouth every 6 hours as needed for Pain    ALBUTEROL SULFATE HFA (PROVENTIL;VENTOLIN;PROAIR) 108 (90 BASE) MCG/ACT INHALER    Inhale 2 puffs into the lungs every 6 hours as needed for Shortness of Breath    ATORVASTATIN (LIPITOR) 10 MG TABLET    TAKE 1 TABLET ONCE DAILY ORALLY ONCE A DAY 90 DAYS    AZELASTINE  MCG/SPRAY SOLN    1 spray by Nasal route daily    CALCIUM CARB-CHOLECALCIFEROL (CALCIUM 500 + D PO)    Take 1 tablet by mouth daily    CETIRIZINE (ZYRTEC) 10 MG TABLET

## 2025-07-17 NOTE — ED NOTES
Pt presenting to the ED with flank pain that has been getting worse with no improvement since Monday. Pt does see urologist and was diagnosed with a stone on Monday. Pt reports she has been having nausea.

## 2025-07-30 ENCOUNTER — HOSPITAL ENCOUNTER (EMERGENCY)
Age: 60
Discharge: HOME | End: 2025-07-30
Attending: EMERGENCY MEDICINE
Payer: COMMERCIAL

## 2025-07-30 VITALS
SYSTOLIC BLOOD PRESSURE: 114 MMHG | HEART RATE: 73 BPM | WEIGHT: 150 LBS | OXYGEN SATURATION: 96 % | RESPIRATION RATE: 16 BRPM | DIASTOLIC BLOOD PRESSURE: 73 MMHG | HEIGHT: 66 IN | TEMPERATURE: 98.4 F | BODY MASS INDEX: 24.11 KG/M2

## 2025-07-30 DIAGNOSIS — R21 RASH AND OTHER NONSPECIFIC SKIN ERUPTION: Primary | ICD-10-CM

## 2025-07-30 PROCEDURE — 96372 THER/PROPH/DIAG INJ SC/IM: CPT

## 2025-07-30 PROCEDURE — 6360000002 HC RX W HCPCS

## 2025-07-30 PROCEDURE — 6370000000 HC RX 637 (ALT 250 FOR IP)

## 2025-07-30 PROCEDURE — 99284 EMERGENCY DEPT VISIT MOD MDM: CPT

## 2025-07-30 RX ORDER — DEXAMETHASONE SODIUM PHOSPHATE 10 MG/ML
10 INJECTION, SOLUTION INTRAMUSCULAR; INTRAVENOUS ONCE
Status: COMPLETED | OUTPATIENT
Start: 2025-07-30 | End: 2025-07-30

## 2025-07-30 RX ORDER — DIPHENHYDRAMINE HCL 25 MG
25 TABLET ORAL ONCE
Status: COMPLETED | OUTPATIENT
Start: 2025-07-30 | End: 2025-07-30

## 2025-07-30 RX ADMIN — DIPHENHYDRAMINE HCL 25 MG: 25 TABLET ORAL at 22:43

## 2025-07-30 RX ADMIN — DEXAMETHASONE SODIUM PHOSPHATE 10 MG: 10 INJECTION INTRAMUSCULAR; INTRAVENOUS at 22:43

## 2025-07-30 ASSESSMENT — ENCOUNTER SYMPTOMS
COLOR CHANGE: 0
SHORTNESS OF BREATH: 0
BACK PAIN: 0

## 2025-07-30 ASSESSMENT — PAIN SCALES - GENERAL: PAINLEVEL_OUTOF10: 4

## 2025-07-30 ASSESSMENT — PAIN - FUNCTIONAL ASSESSMENT: PAIN_FUNCTIONAL_ASSESSMENT: 0-10

## 2025-07-31 NOTE — ED NOTES
Pt arrived to ed with c/o possible bug bites that started yesterday and pt noticed more today. Pt was seen at  and told to come here. Pt A&Ox4.

## 2025-07-31 NOTE — DISCHARGE INSTRUCTIONS
Take Benadryl as needed for itching.    Apply steroid ointment as prescribed.    Recommended patient follow up with PCP for further evaluation and management. Return to ED if patient develops worsening rash, itching, fever, chills, difficulty breathing.

## 2025-07-31 NOTE — ED PROVIDER NOTES
[facility] EMERGENCY DEPARTMENT  eMERGENCY dEPARTMENT eNCOUnter      Pt Name: Reta Flores  MRN: [id number]  Birthdate 1965  Date of evaluation: 7/30/2025  Provider: Laverne Ratliff PA-C    CHIEF COMPLAINT       Chief Complaint   Patient presents with    Rash     States she has MRSA, states seen at urgent care for her random bumps that she has.  Pt was prescribed bugbite cream at urgent care         HISTORY OF PRESENT ILLNESS  (Location/Symptom, Timing/Onset, Context/Setting, Quality, Duration, Modifying Factors, Severity.)   Reta Flores is a 60 y.o. female who presents to the emergency department with a rash.  Patient states she first noticed the rash last night on her lower extremities.  Today she noticed the rash on her lower abdomen.  She reports associated pruritus and erythema.  She was seen at urgent care and was prescribed a steroid ointment.  She denies new lotions, soaps, detergents, medications, exposures.  She denies fever, chills, signs of infection, discharge.    Nursing Notes were reviewed.    ALLERGIES     Ibuprofen, Amoxicillin, and Ciprofloxacin    CURRENT MEDICATIONS       Previous Medications    ACETAMINOPHEN (TYLENOL) 500 MG TABLET    Take 2 tablets by mouth every 6 hours as needed for Pain    ALBUTEROL SULFATE HFA (PROVENTIL;VENTOLIN;PROAIR) 108 (90 BASE) MCG/ACT INHALER    Inhale 2 puffs into the lungs every 6 hours as needed for Shortness of Breath    ATORVASTATIN (LIPITOR) 10 MG TABLET    TAKE 1 TABLET ONCE DAILY ORALLY ONCE A DAY 90 DAYS    AZELASTINE  MCG/SPRAY SOLN    1 spray by Nasal route daily    CALCIUM CARB-CHOLECALCIFEROL (CALCIUM 500 + D PO)    Take 1 tablet by mouth daily    CETIRIZINE (ZYRTEC) 10 MG TABLET    Take 1 tablet by mouth 2 times daily    CLONAZEPAM (KLONOPIN) 1 MG TABLET    Take 1 tablet by mouth at bedtime.    EPINEPHRINE (SYMJEPI) 0.3 MG/0.3ML SOSY INJECTION    Inject 0.3 mLs into the muscle as needed for Anaphylaxis    FAMOTIDINE (PEPCID) 20

## 2025-09-03 ENCOUNTER — OFFICE VISIT (OUTPATIENT)
Dept: SURGERY | Age: 60
End: 2025-09-03
Payer: COMMERCIAL

## 2025-09-03 VITALS
SYSTOLIC BLOOD PRESSURE: 216 MMHG | DIASTOLIC BLOOD PRESSURE: 80 MMHG | HEIGHT: 66 IN | WEIGHT: 152.8 LBS | HEART RATE: 74 BPM | BODY MASS INDEX: 24.56 KG/M2

## 2025-09-03 DIAGNOSIS — E65 SYMPTOMATIC ABDOMINAL PANNICULUS: Primary | ICD-10-CM

## 2025-09-03 DIAGNOSIS — M62.08 DIASTASIS OF RECTUS ABDOMINIS: ICD-10-CM

## 2025-09-03 PROCEDURE — 99213 OFFICE O/P EST LOW 20 MIN: CPT | Performed by: PLASTIC SURGERY

## 2025-09-03 RX ORDER — NYSTATIN 100000 U/G
CREAM TOPICAL
COMMUNITY
Start: 2025-08-14

## 2025-09-03 RX ORDER — SEMAGLUTIDE 2.4 MG/.75ML
INJECTION, SOLUTION SUBCUTANEOUS
COMMUNITY

## (undated) DEVICE — SINGLE-USE BIOPSY FORCEPS: Brand: RADIAL JAW 4

## (undated) DEVICE — BASIN EMSIS 700ML GRAPHITE PLAS KID SHP GRAD

## (undated) DEVICE — ADAPTER CLEANING PORPOISE CLEANING

## (undated) DEVICE — SIMPLICITY FLUFF UNDERPAD 23X36, MODERATE: Brand: SIMPLICITY

## (undated) DEVICE — TUBING, SUCTION, 3/16" X 10', STRAIGHT: Brand: MEDLINE

## (undated) DEVICE — YANKAUER,FLEXIBLE HANDLE,REGLR CAPACITY: Brand: MEDLINE INDUSTRIES, INC.

## (undated) DEVICE — JELLY,LUBE,STERILE,FLIP TOP,TUBE,2-OZ: Brand: MEDLINE

## (undated) DEVICE — CONNECTOR TBNG AUX H2O JET DISP FOR OLY 160/180 SER

## (undated) DEVICE — ADAPTER,CATHETER/SYRINGE/LUER,STERILE: Brand: MEDLINE

## (undated) DEVICE — CANNULA IV 18GA L15IN BLNT FILL LUERLOCK HUB MJCT

## (undated) DEVICE — FORCEPS BX L240CM JAW DIA2.8MM L CAP W/ NDL MIC MESH TOOTH

## (undated) DEVICE — BITEBLOCK 54FR W/ DENT RIM BLOX

## (undated) DEVICE — Device: Brand: DEFENDO VALVE AND CONNECTOR KIT

## (undated) DEVICE — 4-PORT MANIFOLD: Brand: NEPTUNE 2